# Patient Record
Sex: MALE | Race: WHITE | NOT HISPANIC OR LATINO | URBAN - METROPOLITAN AREA
[De-identification: names, ages, dates, MRNs, and addresses within clinical notes are randomized per-mention and may not be internally consistent; named-entity substitution may affect disease eponyms.]

---

## 2020-05-24 ENCOUNTER — HOSPITAL ENCOUNTER (INPATIENT)
Facility: HOSPITAL | Age: 85
LOS: 4 days | Discharge: HOME HEALTH | DRG: 291 | End: 2020-05-29
Attending: EMERGENCY MEDICINE | Admitting: HOSPITALIST
Payer: MEDICARE

## 2020-05-24 DIAGNOSIS — J18.9 COMMUNITY ACQUIRED PNEUMONIA, UNSPECIFIED LATERALITY: ICD-10-CM

## 2020-05-24 DIAGNOSIS — R07.9 CHEST PAIN: ICD-10-CM

## 2020-05-24 DIAGNOSIS — I50.9 ACUTE CONGESTIVE HEART FAILURE: ICD-10-CM

## 2020-05-24 DIAGNOSIS — R06.02 SHORTNESS OF BREATH: ICD-10-CM

## 2020-05-24 DIAGNOSIS — I50.9 ACUTE ON CHRONIC CONGESTIVE HEART FAILURE, UNSPECIFIED HEART FAILURE TYPE: Primary | ICD-10-CM

## 2020-05-24 LAB
ALBUMIN SERPL BCP-MCNC: 2.7 G/DL (ref 3.5–5.2)
ALP SERPL-CCNC: 155 U/L (ref 55–135)
ALT SERPL W/O P-5'-P-CCNC: 58 U/L (ref 10–44)
ANION GAP SERPL CALC-SCNC: 7 MMOL/L (ref 8–16)
APTT BLDCRRT: 29.9 SEC (ref 21–32)
AST SERPL-CCNC: 101 U/L (ref 10–40)
BASOPHILS # BLD AUTO: 0.03 K/UL (ref 0–0.2)
BASOPHILS NFR BLD: 0.4 % (ref 0–1.9)
BILIRUB SERPL-MCNC: 0.8 MG/DL (ref 0.1–1)
BILIRUB UR QL STRIP: NEGATIVE
BNP SERPL-MCNC: 223 PG/ML (ref 0–99)
BUN SERPL-MCNC: 25 MG/DL (ref 8–23)
CALCIUM SERPL-MCNC: 9.7 MG/DL (ref 8.7–10.5)
CHLORIDE SERPL-SCNC: 103 MMOL/L (ref 95–110)
CLARITY UR REFRACT.AUTO: CLEAR
CO2 SERPL-SCNC: 29 MMOL/L (ref 23–29)
COLOR UR AUTO: YELLOW
CREAT SERPL-MCNC: 1.1 MG/DL (ref 0.5–1.4)
DIFFERENTIAL METHOD: ABNORMAL
EOSINOPHIL # BLD AUTO: 0.4 K/UL (ref 0–0.5)
EOSINOPHIL NFR BLD: 4.9 % (ref 0–8)
ERYTHROCYTE [DISTWIDTH] IN BLOOD BY AUTOMATED COUNT: 14.5 % (ref 11.5–14.5)
EST. GFR  (AFRICAN AMERICAN): >60 ML/MIN/1.73 M^2
EST. GFR  (NON AFRICAN AMERICAN): 58.8 ML/MIN/1.73 M^2
GLUCOSE SERPL-MCNC: 105 MG/DL (ref 70–110)
GLUCOSE UR QL STRIP: NEGATIVE
HCT VFR BLD AUTO: 43.9 % (ref 40–54)
HGB BLD-MCNC: 14.2 G/DL (ref 14–18)
HGB UR QL STRIP: NEGATIVE
HYALINE CASTS UR QL AUTO: 6 /LPF
IMM GRANULOCYTES # BLD AUTO: 0.02 K/UL (ref 0–0.04)
IMM GRANULOCYTES NFR BLD AUTO: 0.3 % (ref 0–0.5)
INR PPP: 1.3 (ref 0.8–1.2)
KETONES UR QL STRIP: NEGATIVE
LACTATE SERPL-SCNC: 1.2 MMOL/L (ref 0.5–2.2)
LEUKOCYTE ESTERASE UR QL STRIP: NEGATIVE
LYMPHOCYTES # BLD AUTO: 0.7 K/UL (ref 1–4.8)
LYMPHOCYTES NFR BLD: 10.4 % (ref 18–48)
MCH RBC QN AUTO: 34.1 PG (ref 27–31)
MCHC RBC AUTO-ENTMCNC: 32.3 G/DL (ref 32–36)
MCV RBC AUTO: 105 FL (ref 82–98)
MICROSCOPIC COMMENT: ABNORMAL
MONOCYTES # BLD AUTO: 1.5 K/UL (ref 0.3–1)
MONOCYTES NFR BLD: 20.6 % (ref 4–15)
NEUTROPHILS # BLD AUTO: 4.5 K/UL (ref 1.8–7.7)
NEUTROPHILS NFR BLD: 63.4 % (ref 38–73)
NITRITE UR QL STRIP: NEGATIVE
NRBC BLD-RTO: 0 /100 WBC
PH UR STRIP: 5 [PH] (ref 5–8)
PLATELET # BLD AUTO: 211 K/UL (ref 150–350)
PMV BLD AUTO: 9.9 FL (ref 9.2–12.9)
POTASSIUM SERPL-SCNC: 5.5 MMOL/L (ref 3.5–5.1)
PROT SERPL-MCNC: 6.6 G/DL (ref 6–8.4)
PROT UR QL STRIP: NEGATIVE
PROTHROMBIN TIME: 12.8 SEC (ref 9–12.5)
RBC # BLD AUTO: 4.17 M/UL (ref 4.6–6.2)
RBC #/AREA URNS AUTO: 3 /HPF (ref 0–4)
SARS-COV-2 RDRP RESP QL NAA+PROBE: NEGATIVE
SODIUM SERPL-SCNC: 139 MMOL/L (ref 136–145)
SP GR UR STRIP: 1.01 (ref 1–1.03)
TROPONIN I SERPL DL<=0.01 NG/ML-MCNC: 0.03 NG/ML (ref 0–0.03)
URN SPEC COLLECT METH UR: NORMAL
WBC # BLD AUTO: 7.1 K/UL (ref 3.9–12.7)
WBC #/AREA URNS AUTO: 1 /HPF (ref 0–5)

## 2020-05-24 PROCEDURE — 87040 BLOOD CULTURE FOR BACTERIA: CPT | Mod: 59

## 2020-05-24 PROCEDURE — 80053 COMPREHEN METABOLIC PANEL: CPT

## 2020-05-24 PROCEDURE — 82607 VITAMIN B-12: CPT

## 2020-05-24 PROCEDURE — 84484 ASSAY OF TROPONIN QUANT: CPT

## 2020-05-24 PROCEDURE — 85610 PROTHROMBIN TIME: CPT

## 2020-05-24 PROCEDURE — 85025 COMPLETE CBC W/AUTO DIFF WBC: CPT

## 2020-05-24 PROCEDURE — 83605 ASSAY OF LACTIC ACID: CPT

## 2020-05-24 PROCEDURE — 85730 THROMBOPLASTIN TIME PARTIAL: CPT

## 2020-05-24 PROCEDURE — 83880 ASSAY OF NATRIURETIC PEPTIDE: CPT

## 2020-05-24 PROCEDURE — 96375 TX/PRO/DX INJ NEW DRUG ADDON: CPT

## 2020-05-24 PROCEDURE — 99285 EMERGENCY DEPT VISIT HI MDM: CPT | Mod: ,,, | Performed by: EMERGENCY MEDICINE

## 2020-05-24 PROCEDURE — 96365 THER/PROPH/DIAG IV INF INIT: CPT

## 2020-05-24 PROCEDURE — 93010 ELECTROCARDIOGRAM REPORT: CPT | Mod: ,,, | Performed by: INTERNAL MEDICINE

## 2020-05-24 PROCEDURE — 93010 EKG 12-LEAD: ICD-10-PCS | Mod: ,,, | Performed by: INTERNAL MEDICINE

## 2020-05-24 PROCEDURE — 99285 EMERGENCY DEPT VISIT HI MDM: CPT | Mod: 25

## 2020-05-24 PROCEDURE — 99285 PR EMERGENCY DEPT VISIT,LEVEL V: ICD-10-PCS | Mod: ,,, | Performed by: EMERGENCY MEDICINE

## 2020-05-24 PROCEDURE — U0002 COVID-19 LAB TEST NON-CDC: HCPCS

## 2020-05-24 PROCEDURE — 82746 ASSAY OF FOLIC ACID SERUM: CPT

## 2020-05-24 PROCEDURE — 81001 URINALYSIS AUTO W/SCOPE: CPT

## 2020-05-24 PROCEDURE — 93005 ELECTROCARDIOGRAM TRACING: CPT

## 2020-05-24 RX ORDER — LISINOPRIL 10 MG/1
10 TABLET ORAL DAILY
Status: ON HOLD | COMMUNITY
End: 2020-05-29 | Stop reason: HOSPADM

## 2020-05-24 RX ORDER — IBUPROFEN 200 MG
1 CAPSULE ORAL DAILY
COMMUNITY

## 2020-05-24 RX ORDER — CIPROFLOXACIN 250 MG/1
250 TABLET, FILM COATED ORAL 2 TIMES DAILY
Status: ON HOLD | COMMUNITY
End: 2020-05-29 | Stop reason: HOSPADM

## 2020-05-24 RX ORDER — SIMVASTATIN 20 MG/1
40 TABLET, FILM COATED ORAL NIGHTLY
COMMUNITY

## 2020-05-25 PROBLEM — I25.10 CORONARY ARTERY DISEASE INVOLVING NATIVE CORONARY ARTERY OF NATIVE HEART WITHOUT ANGINA PECTORIS: Status: ACTIVE | Noted: 2020-05-25

## 2020-05-25 PROBLEM — E87.5 HYPERKALEMIA: Status: ACTIVE | Noted: 2020-05-25

## 2020-05-25 PROBLEM — I48.91 ATRIAL FIBRILLATION: Status: ACTIVE | Noted: 2020-05-25

## 2020-05-25 PROBLEM — I50.9 ACUTE ON CHRONIC CONGESTIVE HEART FAILURE: Status: ACTIVE | Noted: 2020-05-25

## 2020-05-25 PROBLEM — N17.9 AKI (ACUTE KIDNEY INJURY): Status: ACTIVE | Noted: 2020-05-25

## 2020-05-25 PROBLEM — J18.9 BILATERAL PNEUMONIA: Status: ACTIVE | Noted: 2020-05-25

## 2020-05-25 PROBLEM — R74.01 TRANSAMINITIS: Status: ACTIVE | Noted: 2020-05-25

## 2020-05-25 PROBLEM — I27.20 PULMONARY HYPERTENSION: Status: ACTIVE | Noted: 2020-05-25

## 2020-05-25 PROBLEM — I10 ESSENTIAL HYPERTENSION: Status: ACTIVE | Noted: 2020-05-25

## 2020-05-25 PROBLEM — J96.01 ACUTE HYPOXEMIC RESPIRATORY FAILURE: Status: ACTIVE | Noted: 2020-05-25

## 2020-05-25 PROBLEM — Z71.89 DO NOT RESUSCITATE DISCUSSION: Status: ACTIVE | Noted: 2020-05-25

## 2020-05-25 LAB
ALBUMIN SERPL BCP-MCNC: 2.5 G/DL (ref 3.5–5.2)
ALP SERPL-CCNC: 136 U/L (ref 55–135)
ALT SERPL W/O P-5'-P-CCNC: 49 U/L (ref 10–44)
ANION GAP SERPL CALC-SCNC: 8 MMOL/L (ref 8–16)
ASCENDING AORTA: 3.5 CM
AST SERPL-CCNC: 69 U/L (ref 10–40)
AV INDEX (PROSTH): 0.94
AV MEAN GRADIENT: 3 MMHG
AV PEAK GRADIENT: 5 MMHG
AV VALVE AREA: 3.25 CM2
AV VELOCITY RATIO: 0.82
BASOPHILS # BLD AUTO: 0.03 K/UL (ref 0–0.2)
BASOPHILS # BLD AUTO: 0.03 K/UL (ref 0–0.2)
BASOPHILS NFR BLD: 0.4 % (ref 0–1.9)
BASOPHILS NFR BLD: 0.4 % (ref 0–1.9)
BILIRUB SERPL-MCNC: 0.9 MG/DL (ref 0.1–1)
BSA FOR ECHO PROCEDURE: 1.87 M2
BUN SERPL-MCNC: 21 MG/DL (ref 8–23)
CALCIUM SERPL-MCNC: 9.2 MG/DL (ref 8.7–10.5)
CHLORIDE SERPL-SCNC: 100 MMOL/L (ref 95–110)
CHOLEST SERPL-MCNC: 93 MG/DL (ref 120–199)
CHOLEST/HDLC SERPL: 2.7 {RATIO} (ref 2–5)
CO2 SERPL-SCNC: 33 MMOL/L (ref 23–29)
CREAT SERPL-MCNC: 1 MG/DL (ref 0.5–1.4)
CV ECHO LV RWT: 0.3 CM
DIFFERENTIAL METHOD: ABNORMAL
DIFFERENTIAL METHOD: ABNORMAL
DOP CALC AO PEAK VEL: 1.1 M/S
DOP CALC AO VTI: 18.76 CM
DOP CALC LVOT AREA: 3.5 CM2
DOP CALC LVOT DIAMETER: 2.1 CM
DOP CALC LVOT PEAK VEL: 0.9 M/S
DOP CALC LVOT STROKE VOLUME: 61 CM3
DOP CALCLVOT PEAK VEL VTI: 17.62 CM
E/A RATIO: 3.96
E/E' RATIO: 0.13 M/S
ECHO LV POSTERIOR WALL: 0.66 CM (ref 0.6–1.1)
EOSINOPHIL # BLD AUTO: 0.3 K/UL (ref 0–0.5)
EOSINOPHIL # BLD AUTO: 0.4 K/UL (ref 0–0.5)
EOSINOPHIL NFR BLD: 3.9 % (ref 0–8)
EOSINOPHIL NFR BLD: 4.9 % (ref 0–8)
ERYTHROCYTE [DISTWIDTH] IN BLOOD BY AUTOMATED COUNT: 14.3 % (ref 11.5–14.5)
ERYTHROCYTE [DISTWIDTH] IN BLOOD BY AUTOMATED COUNT: 14.3 % (ref 11.5–14.5)
EST. GFR  (AFRICAN AMERICAN): >60 ML/MIN/1.73 M^2
EST. GFR  (NON AFRICAN AMERICAN): >60 ML/MIN/1.73 M^2
ESTIMATED AVG GLUCOSE: 105 MG/DL (ref 68–131)
FOLATE SERPL-MCNC: 17.3 NG/ML (ref 4–24)
FRACTIONAL SHORTENING: 27 % (ref 28–44)
GLUCOSE SERPL-MCNC: 95 MG/DL (ref 70–110)
HBA1C MFR BLD HPLC: 5.3 % (ref 4–5.6)
HCT VFR BLD AUTO: 44.7 % (ref 40–54)
HCT VFR BLD AUTO: 44.7 % (ref 40–54)
HDLC SERPL-MCNC: 35 MG/DL (ref 40–75)
HDLC SERPL: 37.6 % (ref 20–50)
HGB BLD-MCNC: 14.1 G/DL (ref 14–18)
HGB BLD-MCNC: 14.5 G/DL (ref 14–18)
HIV 1+2 AB+HIV1 P24 AG SERPL QL IA: NEGATIVE
IMM GRANULOCYTES # BLD AUTO: 0.02 K/UL (ref 0–0.04)
IMM GRANULOCYTES # BLD AUTO: 0.03 K/UL (ref 0–0.04)
IMM GRANULOCYTES NFR BLD AUTO: 0.3 % (ref 0–0.5)
IMM GRANULOCYTES NFR BLD AUTO: 0.4 % (ref 0–0.5)
INTERVENTRICULAR SEPTUM: 0.57 CM (ref 0.6–1.1)
LA MAJOR: 5.59 CM
LA MINOR: 5.55 CM
LA WIDTH: 3.99 CM
LACTATE SERPL-SCNC: 1 MMOL/L (ref 0.5–2.2)
LDLC SERPL CALC-MCNC: 51.2 MG/DL (ref 63–159)
LEFT ATRIUM SIZE: 4.3 CM
LEFT ATRIUM VOLUME INDEX: 43.2 ML/M2
LEFT ATRIUM VOLUME: 81.23 CM3
LEFT INTERNAL DIMENSION IN SYSTOLE: 3.18 CM (ref 2.1–4)
LEFT VENTRICLE DIASTOLIC VOLUME INDEX: 46.14 ML/M2
LEFT VENTRICLE DIASTOLIC VOLUME: 86.65 ML
LEFT VENTRICLE MASS INDEX: 41 G/M2
LEFT VENTRICLE SYSTOLIC VOLUME INDEX: 21.4 ML/M2
LEFT VENTRICLE SYSTOLIC VOLUME: 40.22 ML
LEFT VENTRICULAR INTERNAL DIMENSION IN DIASTOLE: 4.38 CM (ref 3.5–6)
LEFT VENTRICULAR MASS: 77.59 G
LV LATERAL E/E' RATIO: 0.11 M/S
LV SEPTAL E/E' RATIO: 0.15 M/S
LYMPHOCYTES # BLD AUTO: 0.6 K/UL (ref 1–4.8)
LYMPHOCYTES # BLD AUTO: 0.7 K/UL (ref 1–4.8)
LYMPHOCYTES NFR BLD: 7.6 % (ref 18–48)
LYMPHOCYTES NFR BLD: 8.7 % (ref 18–48)
MAGNESIUM SERPL-MCNC: 1.9 MG/DL (ref 1.6–2.6)
MCH RBC QN AUTO: 33.3 PG (ref 27–31)
MCH RBC QN AUTO: 33.7 PG (ref 27–31)
MCHC RBC AUTO-ENTMCNC: 31.5 G/DL (ref 32–36)
MCHC RBC AUTO-ENTMCNC: 32.4 G/DL (ref 32–36)
MCV RBC AUTO: 104 FL (ref 82–98)
MCV RBC AUTO: 106 FL (ref 82–98)
MONOCYTES # BLD AUTO: 1.4 K/UL (ref 0.3–1)
MONOCYTES # BLD AUTO: 1.5 K/UL (ref 0.3–1)
MONOCYTES NFR BLD: 18.9 % (ref 4–15)
MONOCYTES NFR BLD: 20.4 % (ref 4–15)
MV PEAK A VEL: 0.26 M/S
MV PEAK E VEL: 1.03 M/S
NEUTROPHILS # BLD AUTO: 5 K/UL (ref 1.8–7.7)
NEUTROPHILS # BLD AUTO: 5.1 K/UL (ref 1.8–7.7)
NEUTROPHILS NFR BLD: 66.8 % (ref 38–73)
NEUTROPHILS NFR BLD: 67.3 % (ref 38–73)
NONHDLC SERPL-MCNC: 58 MG/DL
NRBC BLD-RTO: 0 /100 WBC
NRBC BLD-RTO: 0 /100 WBC
PATH REV BLD -IMP: NORMAL
PHOSPHATE SERPL-MCNC: 3.5 MG/DL (ref 2.7–4.5)
PISA TR MAX VEL: 3.68 M/S
PLATELET # BLD AUTO: 214 K/UL (ref 150–350)
PLATELET # BLD AUTO: 216 K/UL (ref 150–350)
PMV BLD AUTO: 9.6 FL (ref 9.2–12.9)
PMV BLD AUTO: 9.8 FL (ref 9.2–12.9)
POTASSIUM SERPL-SCNC: 4.5 MMOL/L (ref 3.5–5.1)
PROT SERPL-MCNC: 5.9 G/DL (ref 6–8.4)
RA MAJOR: 5.63 CM
RA PRESSURE: 15 MMHG
RA WIDTH: 5.24 CM
RBC # BLD AUTO: 4.23 M/UL (ref 4.6–6.2)
RBC # BLD AUTO: 4.3 M/UL (ref 4.6–6.2)
RIGHT VENTRICULAR END-DIASTOLIC DIMENSION: 3.67 CM
RV TISSUE DOPPLER FREE WALL SYSTOLIC VELOCITY 1 (APICAL 4 CHAMBER VIEW): 7.9 CM/S
SINUS: 3.32 CM
SODIUM SERPL-SCNC: 141 MMOL/L (ref 136–145)
STJ: 3.02 CM
TDI LATERAL: 9 M/S
TDI SEPTAL: 7 M/S
TDI: 8 M/S
TR MAX PG: 54 MMHG
TRICUSPID ANNULAR PLANE SYSTOLIC EXCURSION: 1.11 CM
TRIGL SERPL-MCNC: 34 MG/DL (ref 30–150)
TROPONIN I SERPL DL<=0.01 NG/ML-MCNC: 0.04 NG/ML (ref 0–0.03)
TSH SERPL DL<=0.005 MIU/L-ACNC: 2.11 UIU/ML (ref 0.4–4)
TV REST PULMONARY ARTERY PRESSURE: 69 MMHG
VIT B12 SERPL-MCNC: >2000 PG/ML (ref 210–950)
WBC # BLD AUTO: 7.36 K/UL (ref 3.9–12.7)
WBC # BLD AUTO: 7.58 K/UL (ref 3.9–12.7)

## 2020-05-25 PROCEDURE — 11000001 HC ACUTE MED/SURG PRIVATE ROOM

## 2020-05-25 PROCEDURE — 99223 PR INITIAL HOSPITAL CARE,LEVL III: ICD-10-PCS | Mod: AI,GC,, | Performed by: HOSPITALIST

## 2020-05-25 PROCEDURE — 27000221 HC OXYGEN, UP TO 24 HOURS

## 2020-05-25 PROCEDURE — 25000003 PHARM REV CODE 250: Performed by: STUDENT IN AN ORGANIZED HEALTH CARE EDUCATION/TRAINING PROGRAM

## 2020-05-25 PROCEDURE — 94761 N-INVAS EAR/PLS OXIMETRY MLT: CPT

## 2020-05-25 PROCEDURE — 36415 COLL VENOUS BLD VENIPUNCTURE: CPT

## 2020-05-25 PROCEDURE — 85060 PATHOLOGIST REVIEW: ICD-10-PCS | Mod: ,,, | Performed by: PATHOLOGY

## 2020-05-25 PROCEDURE — 83735 ASSAY OF MAGNESIUM: CPT

## 2020-05-25 PROCEDURE — 80061 LIPID PANEL: CPT

## 2020-05-25 PROCEDURE — 84100 ASSAY OF PHOSPHORUS: CPT

## 2020-05-25 PROCEDURE — 84484 ASSAY OF TROPONIN QUANT: CPT

## 2020-05-25 PROCEDURE — 86703 HIV-1/HIV-2 1 RESULT ANTBDY: CPT

## 2020-05-25 PROCEDURE — 85060 BLOOD SMEAR INTERPRETATION: CPT | Mod: ,,, | Performed by: PATHOLOGY

## 2020-05-25 PROCEDURE — 63600175 PHARM REV CODE 636 W HCPCS: Performed by: STUDENT IN AN ORGANIZED HEALTH CARE EDUCATION/TRAINING PROGRAM

## 2020-05-25 PROCEDURE — 99223 1ST HOSP IP/OBS HIGH 75: CPT | Mod: AI,GC,, | Performed by: HOSPITALIST

## 2020-05-25 PROCEDURE — 83605 ASSAY OF LACTIC ACID: CPT

## 2020-05-25 PROCEDURE — 83036 HEMOGLOBIN GLYCOSYLATED A1C: CPT

## 2020-05-25 PROCEDURE — 99900035 HC TECH TIME PER 15 MIN (STAT)

## 2020-05-25 PROCEDURE — 84443 ASSAY THYROID STIM HORMONE: CPT

## 2020-05-25 PROCEDURE — 85025 COMPLETE CBC W/AUTO DIFF WBC: CPT | Mod: 91

## 2020-05-25 PROCEDURE — 80053 COMPREHEN METABOLIC PANEL: CPT

## 2020-05-25 RX ORDER — SODIUM CHLORIDE 0.9 % (FLUSH) 0.9 %
10 SYRINGE (ML) INJECTION
Status: DISCONTINUED | OUTPATIENT
Start: 2020-05-25 | End: 2020-05-29 | Stop reason: HOSPADM

## 2020-05-25 RX ORDER — FUROSEMIDE 10 MG/ML
40 INJECTION INTRAMUSCULAR; INTRAVENOUS 2 TIMES DAILY
Status: DISCONTINUED | OUTPATIENT
Start: 2020-05-25 | End: 2020-05-26

## 2020-05-25 RX ORDER — IBUPROFEN 200 MG
24 TABLET ORAL
Status: DISCONTINUED | OUTPATIENT
Start: 2020-05-25 | End: 2020-05-29 | Stop reason: HOSPADM

## 2020-05-25 RX ORDER — GLUCAGON 1 MG
1 KIT INJECTION
Status: DISCONTINUED | OUTPATIENT
Start: 2020-05-25 | End: 2020-05-29 | Stop reason: HOSPADM

## 2020-05-25 RX ORDER — ONDANSETRON 8 MG/1
8 TABLET, ORALLY DISINTEGRATING ORAL EVERY 8 HOURS PRN
Status: DISCONTINUED | OUTPATIENT
Start: 2020-05-25 | End: 2020-05-29 | Stop reason: HOSPADM

## 2020-05-25 RX ORDER — CEFTRIAXONE 1 G/1
1 INJECTION, POWDER, FOR SOLUTION INTRAMUSCULAR; INTRAVENOUS
Status: COMPLETED | OUTPATIENT
Start: 2020-05-25 | End: 2020-05-25

## 2020-05-25 RX ORDER — FUROSEMIDE 10 MG/ML
40 INJECTION INTRAMUSCULAR; INTRAVENOUS
Status: COMPLETED | OUTPATIENT
Start: 2020-05-25 | End: 2020-05-25

## 2020-05-25 RX ORDER — ENOXAPARIN SODIUM 100 MG/ML
40 INJECTION SUBCUTANEOUS EVERY 24 HOURS
Status: DISCONTINUED | OUTPATIENT
Start: 2020-05-25 | End: 2020-05-25

## 2020-05-25 RX ORDER — ACETAMINOPHEN 500 MG
1000 TABLET ORAL EVERY 8 HOURS PRN
Status: DISCONTINUED | OUTPATIENT
Start: 2020-05-25 | End: 2020-05-29 | Stop reason: HOSPADM

## 2020-05-25 RX ORDER — LISINOPRIL 10 MG/1
10 TABLET ORAL DAILY
Status: DISCONTINUED | OUTPATIENT
Start: 2020-05-25 | End: 2020-05-26

## 2020-05-25 RX ORDER — IBUPROFEN 200 MG
16 TABLET ORAL
Status: DISCONTINUED | OUTPATIENT
Start: 2020-05-25 | End: 2020-05-29 | Stop reason: HOSPADM

## 2020-05-25 RX ORDER — SIMVASTATIN 40 MG/1
40 TABLET, FILM COATED ORAL NIGHTLY
Status: DISCONTINUED | OUTPATIENT
Start: 2020-05-25 | End: 2020-05-29 | Stop reason: HOSPADM

## 2020-05-25 RX ADMIN — FUROSEMIDE 40 MG: 10 INJECTION, SOLUTION INTRAMUSCULAR; INTRAVENOUS at 06:05

## 2020-05-25 RX ADMIN — LISINOPRIL 10 MG: 10 TABLET ORAL at 09:05

## 2020-05-25 RX ADMIN — SIMVASTATIN 40 MG: 40 TABLET, FILM COATED ORAL at 08:05

## 2020-05-25 RX ADMIN — FUROSEMIDE 40 MG: 10 INJECTION, SOLUTION INTRAVENOUS at 12:05

## 2020-05-25 RX ADMIN — AZITHROMYCIN MONOHYDRATE 500 MG: 500 INJECTION, POWDER, LYOPHILIZED, FOR SOLUTION INTRAVENOUS at 12:05

## 2020-05-25 RX ADMIN — FUROSEMIDE 40 MG: 10 INJECTION, SOLUTION INTRAMUSCULAR; INTRAVENOUS at 09:05

## 2020-05-25 RX ADMIN — CEFTRIAXONE SODIUM 1 G: 1 INJECTION, POWDER, FOR SOLUTION INTRAMUSCULAR; INTRAVENOUS at 12:05

## 2020-05-25 RX ADMIN — RIVAROXABAN 15 MG: 15 TABLET, FILM COATED ORAL at 04:05

## 2020-05-25 RX ADMIN — SIMVASTATIN 40 MG: 40 TABLET, FILM COATED ORAL at 02:05

## 2020-05-25 NOTE — HOSPITAL COURSE
Pt diuresing well on 40mg IV lasix BID w/ good UOP and improving SOB. LFTs also improving. BUN/Cr improving. Weaning oxygen. 5/25 2D echo w/ EF 50%, reduced RV systolic function, mild MV regurg, and pulm HTN. TSH and A1C WNL, HIV negative, B12 elevated but smear WNL. Transitioned to PO lasix on 5/27 and O2 weaned to 2L with good UOP. Levofloxacin started on 5/27 for CAP as patient became euvolemic but still requiring O2. O2 further weaned on PO lasix 40mg qd to 1L. Walk test completed. Pt discharged home to complete levaquin dose and home oxygen. F/u cardiology clinic.

## 2020-05-25 NOTE — HPI
Mr. Boris Garcia is a 89 year old male presenting with a chief complaint of SOB. He has a PMH significant for atrial fibrillation (on anticoagulation) and CAD (status post stent placement). The patient reports an approximately two to three week duration of progressively worsening SOB with exertion and laying flat. Symptom is also associated with non-productive cough during episodes of SOB and progressively worsening bilateral lower extremity swelling since symptom onset. He reports symptoms are relieved by rest and sleeping on multiple pillows at night; he is currently requiring 2-3 pillows in order to minimize symptoms. He does not weigh himself and is unsure if he has gained weight. He reports having one stent placed in his heart in approximately 2009, however denies prior history of MI. He also denies symptom association with chest pain, palpitations, fevers, chills, nausea, vomiting, abdominal pain or distention, difficulty urinating or decrease in urination, or prior episodes of similar SOB. He follows with a cardiologist at [facility] (Dr. Harpreet Diaz) and reports making an appointment with him for evaluation of above symptoms, however due to progression of symptoms his daughter contacted EMS after noticing patient appearing SOB. Upon arrival, EMS noted patient to have oxygen saturations in the mid-80's and he was transported to ED here for further care.     At his baseline, he reports living alone and not requiring assistance with ambulation. He is originally from [place], but gets the majority of his medical care at [facility]. His daughter (Eliana Saldaña) is his power of .     ED course: On arrival, his vital signs were significant for tachycardia (120) with respiratory status stable on 3L. Labs were notable for normal WBC, hyperkalemia (5.5), ROSA (BUN 25, Cr 1.1), transaminitis (, ALT 58), elevated BNP (223), and mildly elevated troponin (0.03). He tested negative for COVID-19. CXR was  suggestive of bilateral infiltrates. He received Azithromycin, Ceftriaxone, and Lasix and was admitted to hospital medicine for further management.

## 2020-05-25 NOTE — H&P
[facility] Hospital Medicine  History & Physical    Patient Name: Boris Garcia  MRN: [id number]  Admission Date: 5/24/2020  Attending Physician: Merle Hurtado   Primary Care Provider: To Obtain Unable    Hospital Medicine Team: [facility] 3 [place] MD     Patient information was obtained from patient and ER records.     Subjective:     Principal Problem:Acute hypoxemic respiratory failure    Chief Complaint:   Chief Complaint   Patient presents with    Shortness of Breath     Patient brought in by EMS for evaluation of shortness of breath. Patient is being treated for a UTI currently.         HPI: Mr. Boris Garcia is a 89 year old male presenting with a chief complaint of SOB. He has a PMH significant for atrial fibrillation (on anticoagulation) and CAD (status post stent placement). The patient reports an approximately two to three week duration of progressively worsening SOB with exertion and laying flat. Symptom is also associated with non-productive cough during episodes of SOB and progressively worsening bilateral lower extremity swelling since symptom onset. He reports symptoms are relieved by rest and sleeping on multiple pillows at night; he is currently requiring 2-3 pillows in order to minimize symptoms. He does not weigh himself and is unsure if he has gained weight. He reports having one stent placed in his heart in approximately 2009, however denies prior history of MI. He also denies symptom association with chest pain, palpitations, fevers, chills, nausea, vomiting, abdominal pain or distention, difficulty urinating or decrease in urination, or prior episodes of similar SOB. He follows with a cardiologist at [facility] (Dr. Harpreet Diaz) and reports making an appointment with him for evaluation of above symptoms, however due to progression of symptoms his daughter contacted EMS after noticing patient appearing SOB. Upon arrival, EMS noted patient to have oxygen  saturations in the mid-80's and he was transported to ED here for further care.     At his baseline, he reports living alone and not requiring assistance with ambulation. He is originally from [place], but gets the majority of his medical care at [facility]. His daughter (Eliana Saldaña) is his power of .     ED course: On arrival, his vital signs were significant for tachycardia (120) with respiratory status stable on 3L. Labs were notable for normal WBC, hyperkalemia (5.5), ROSA (BUN 25, Cr 1.1), transaminitis (, ALT 58), elevated BNP (223), and mildly elevated troponin (0.03). He tested negative for COVID-19. CXR was suggestive of bilateral infiltrates. He received Azithromycin, Ceftriaxone, and Lasix and was admitted to hospital medicine for further management.     Past Medical History:   Diagnosis Date   · Atrial fibrillation  · Coronary artery disease  · HTN    Past Surgical History:   · Coronary angioplasty (2009).   · Hernia repair.     Review of patient's allergies indicates:   Allergen Reactions    Penicillins     Sulfa (sulfonamide antibiotics) Hives       No current facility-administered medications on file prior to encounter.      Current Outpatient Medications on File Prior to Encounter   Medication Sig    calcium citrate (CALCITRATE) 200 mg (950 mg) tablet Take 1 tablet by mouth once daily.    ciprofloxacin HCl (CIPRO) 250 MG tablet Take 250 mg by mouth 2 (two) times daily.    lisinopriL 10 MG tablet Take 10 mg by mouth once daily.    rivaroxaban (XARELTO) 15 mg Tab Take 15 mg by mouth daily with dinner or evening meal.    simvastatin (ZOCOR) 20 MG tablet Take 40 mg by mouth every evening.    ciclopirox (LOPROX) 0.77 % Crea Apply twice a day to fungus of back.     Family History     None        Tobacco Use    Smoking status: Not on file   Substance and Sexual Activity    Alcohol use: Not Currently    Drug use: Not on file    Sexual activity: Not on file     Review of Systems    Constitutional: Negative for appetite change, chills, fever and unexpected weight change.   HENT: Negative for sore throat and trouble swallowing.    Eyes: Negative for photophobia and visual disturbance.   Respiratory: Positive for cough and shortness of breath.    Cardiovascular: Positive for leg swelling. Negative for chest pain and palpitations.   Gastrointestinal: Negative for abdominal pain, diarrhea, nausea and vomiting.   Genitourinary: Negative for difficulty urinating.   Musculoskeletal: Negative for back pain, myalgias and neck pain.   Skin: Negative for pallor and rash.   Neurological: Negative for light-headedness, numbness and headaches.     Objective:     Vital Signs (Most Recent):  Temp: 98.1 °F (36.7 °C) (05/24/20 2201)  Pulse: 98 (05/25/20 0039)  Resp: 18 (05/25/20 0039)  BP: (!) 141/83 (05/25/20 0046)  SpO2: 98 % (05/25/20 0039) Vital Signs (24h Range):  Temp:  [98.1 °F (36.7 °C)] 98.1 °F (36.7 °C)  Pulse:  [] 98  Resp:  [16-18] 18  SpO2:  [97 %-100 %] 98 %  BP: (114-141)/(59-83) 141/83     Weight: 69.4 kg (153 lb)  Body mass index is 21.95 kg/m².    Physical Exam   Constitutional: He is oriented to person, place, and time. He appears well-developed and well-nourished. No distress. Nasal cannula in place.   HENT:   Head: Normocephalic and atraumatic.   Mouth/Throat: Oropharynx is clear and moist and mucous membranes are normal.   Eyes: Pupils are equal, round, and reactive to light. Conjunctivae are normal.   Neck: JVD present.   Cardiovascular: Normal rate, regular rhythm, normal heart sounds and normal pulses.   Pulmonary/Chest: Effort normal. No respiratory distress. He has decreased breath sounds in the right upper field and the left upper field. He has rales.   There are bilateral diffuse rales.    Abdominal: Soft. Normal appearance and bowel sounds are normal. He exhibits no distension. There is no tenderness.   Musculoskeletal:   There is diffuse swelling of the bilateral lower  extremities with hyperpigmentation changes of the anterior shins. There is no tenderness with calf palpation.    Neurological: He is alert and oriented to person, place, and time.   Skin: Skin is warm and dry. No bruising and no rash noted.         CRANIAL NERVES     CN III, IV, VI   Pupils are equal, round, and reactive to light.       Significant Labs: All pertinent labs within the past 24 hours have been reviewed.    Significant Imaging: I have reviewed all pertinent imaging results/findings within the past 24 hours.    Assessment/Plan:     * Acute hypoxemic respiratory failure  This is a 89 year old male with PMH notable for atrial fibrillation (on anticoagulation) and CAD (status post stent placement in 2009) who is presenting on 05/24/2020 with almost two week duration of progressively worsening dyspnea on exertion, orthopnea, and lower extremity edema associated with new diagnosis of respiratory failure requiring supplemental oxygen. His presentation is notable for jayy signs of volume overload on exam, elevated BNP, and CXR showing bilateral interstitial opacities. Differential diagnoses favor respiratory failure secondary to acute heart failure of unclear etiology vs occult infection.     Plan:   -Continue diuresis with LASIX 40 mg IV BID.   -Given absent fever, normal WBC, and chronic symptom presentation, will hold off on antibiotic administration and target cardiac etiology of presentation.   -Continue home anticoagulation for atrial fibrillation.   -Trending troponin.   -Screening for HIV and thyroid abnormalities ordered.   -ECHO ordered.   -Lower extremity US ordered.   -Titrate down supplemental oxygen support to goal >92%; avoid hyper-oxygenation.   -Telemetry ordered.   -Strict I/O's and daily weights.   -Cardiac and 2L fluid restricted diet ordered.     ROSA (acute kidney injury)  -Admission notable for BUN/Cr >20 and GFR decline <60 from unknown prior baseline.     Plan:   -Trending renal  "function and urine output daily with IV diuresis.       Essential hypertension  -Home regimen: Lisinopril    Plan:  -Continue home regimen.       Do not resuscitate discussion  -Discussion had at bedside regarding possibility of progression of respiratory failure and possible need for mechanical ventilation. Patient stated that he would not want to be "on a breathing machine" or have chest compressions should his heart stop, due to his advanced age.     Plan:   -DNR form completed in chart; needs attending signature.       Coronary artery disease involving native coronary artery of native heart without angina pectoris  -Status post stent placement in approximately 2009.   -No longer on ASA per his cardiologist.     Plan:   -Continue home Statin.       Transaminitis  -Suspected secondary to congestive hepatopathy in the setting of new CHF exacerbation.     Plan:   -Trending liver function daily.       Hyperkalemia  -Noted on admission, however sample reportedly hemolyzed.   -Status post LASIX.     Plan:   -Repeat kidney function ordered in AM.       Atrial fibrillation  -See assessment for respiratory failure.       Acute on chronic congestive heart failure  -See assessment for respiratory failure.       VTE Risk Mitigation (From admission, onward)         Ordered     rivaroxaban tablet 15 mg  With dinner      05/25/20 0050     IP VTE HIGH RISK PATIENT  Once      05/25/20 0050     Place sequential compression device  Until discontinued      05/25/20 0050                   West Dee MD  Department of Hospital Medicine   [facility]  "

## 2020-05-25 NOTE — ASSESSMENT & PLAN NOTE
-Admission notable for BUN/Cr >20 and GFR decline <60 from unknown prior baseline.     Plan:   -Trending renal function and urine output daily with IV diuresis.

## 2020-05-25 NOTE — ASSESSMENT & PLAN NOTE
-Status post stent placement in approximately 2009.   -No longer on ASA per his cardiologist.     Plan:   -Continue home Statin.

## 2020-05-25 NOTE — MEDICAL/APP STUDENT
HISTORY & PHYSICAL  Hospital Medicine    Team: [facility] [place] [facility]OSP MED 3    PRESENTING HISTORY     Chief Complaint/Reason for Admission:  Shortness of breath    History of Present Illness:  Mr. Boris Garcia is a 89 y.o. male who presented with shortness of breath. Onset of symptoms was gradual starting 2 weeks ago with gradually worsening course since that time. SOB is aggravated on exertion and laying flat.  He also describes an associated productive cough with brown/dark red sputum. Symptoms improve with sitting upright in a chair or laying pillows under his head.  Mr. Garcia states that normally 1 pillow under his head is good enough to sleep at night, but recently describes sit in a chair to get sufficient sleep at night.  Pt denies any sick contacts or COVID + contacts.  He describes a recent UTI that was tx with CIPRO-- but stated that he did not finish the course of ABX.  Pt. Denies n/v, palpitations, or chest pain.   Mr. Garcia is followed by a Cardiologist, Dr. Oreilly at [facility].  Although he had an appointment scheduled with Cardiologist, the progression of his SOB and peripheral edema prompted his daughter to contact EMS and broughr him into [facility] ED.    Mr. Garcia lives alone in his home in [place] and states he is able to walk around fine, but recently lost his 's license due to progressive loss of visual acuity.  His daughter Eliana visits him weekly to provide groceries and company.      ED COURSE   Upon arrival to [facility] ED EMS noted that his O2 Sats were in the mid-80's and his vital signs for significant for a HR of 120.  He had a mild transaminitis (, ALT 58), mildly elevated troponin (0.031).  CXR suggested of bilateral infiltrate.  He received a one time dose of ceftriaxone and azitrhomycin.        Review of Systems:   Review of Systems   Constitutional: Negative for chills, fever and weight loss.   Respiratory: Positive for cough, sputum production and shortness of breath.     Cardiovascular: Positive for orthopnea, leg swelling and PND. Negative for chest pain, palpitations and claudication.   Gastrointestinal: Negative for abdominal pain, diarrhea, nausea and vomiting.   Genitourinary: Negative for dysuria, flank pain, frequency, hematuria and urgency.         PAST HISTORY:     Past Medical History:   Diagnosis Date    Atrial fibrillation     Coronary artery disease     Hypertension        Past Surgical History:   Procedure Laterality Date    CORONARY ANGIOPLASTY  2009       History reviewed. No pertinent family history.    Social History     Socioeconomic History    Marital status: Unknown     Spouse name: Not on file    Number of children: Not on file    Years of education: Not on file    Highest education level: Not on file   Occupational History    Not on file   Social Needs    Financial resource strain: Not on file    Food insecurity:     Worry: Not on file     Inability: Not on file    Transportation needs:     Medical: Not on file     Non-medical: Not on file   Tobacco Use    Smoking status: Former Smoker     Types: Pipe    Smokeless tobacco: Never Used   Substance and Sexual Activity    Alcohol use: Not Currently    Drug use: Never    Sexual activity: Not Currently   Lifestyle    Physical activity:     Days per week: Not on file     Minutes per session: Not on file    Stress: Not on file   Relationships    Social connections:     Talks on phone: Not on file     Gets together: Not on file     Attends Hindu service: Not on file     Active member of club or organization: Not on file     Attends meetings of clubs or organizations: Not on file     Relationship status: Not on file   Other Topics Concern    Not on file   Social History Narrative    Not on file       MEDICATIONS & ALLERGIES:     No current facility-administered medications on file prior to encounter.      Current Outpatient Medications on File Prior to Encounter   Medication Sig Dispense  Refill    calcium citrate (CALCITRATE) 200 mg (950 mg) tablet Take 1 tablet by mouth once daily.      ciprofloxacin HCl (CIPRO) 250 MG tablet Take 250 mg by mouth 2 (two) times daily.      lisinopriL 10 MG tablet Take 10 mg by mouth once daily.      rivaroxaban (XARELTO) 15 mg Tab Take 15 mg by mouth daily with dinner or evening meal.      simvastatin (ZOCOR) 20 MG tablet Take 40 mg by mouth every evening.      ciclopirox (LOPROX) 0.77 % Crea Apply twice a day to fungus of back. 90 g 2        Review of patient's allergies indicates:   Allergen Reactions    Penicillins     Sulfa (sulfonamide antibiotics) Hives       OBJECTIVE:     Vital Signs:  Temp:  [98 °F (36.7 °C)-98.2 °F (36.8 °C)] 98.1 °F (36.7 °C)  Pulse:  [] 89  Resp:  [12-26] 12  SpO2:  [92 %-100 %] 97 %  BP: (114-151)/(59-83) 129/72  Body mass index is 22.46 kg/m².      Physical Exam   Constitutional: He is oriented to person, place, and time and well-developed, well-nourished, and in no distress.   HENT:   Head: Normocephalic and atraumatic.   Cardiovascular: Normal rate, regular rhythm and normal heart sounds. Exam reveals no gallop and no friction rub.   No murmur heard.  Pulmonary/Chest: No respiratory distress. He has decreased breath sounds in the right upper field and the left upper field. He has rales in the right lower field and the left lower field.   Abdominal: He exhibits no distension. There is no tenderness.   Musculoskeletal: He exhibits edema.   Bilateral LE pitting edema   Neurological: He is alert and oriented to person, place, and time.   Skin: Skin is warm and dry. No rash noted. No erythema.         Laboratory  Lab Results   Component Value Date    WBC 7.36 05/25/2020    HGB 14.1 05/25/2020    HCT 44.7 05/25/2020     (H) 05/25/2020     05/25/2020     Recent Labs   Lab 05/25/20  0714   GLU 95      K 4.5      CO2 33*   BUN 21   CREATININE 1.0   CALCIUM 9.2   MG 1.9     Lab Results   Component Value  Date    INR 1.3 (H) 05/24/2020     Lab Results   Component Value Date    HGBA1C 5.3 05/25/2020     No results for input(s): POCTGLUCOSE in the last 72 hours.    Diagnostic Results:      ASSESSMENT & PLAN:     Current Problems List:  Active Hospital Problems    Diagnosis  POA    *Acute hypoxemic respiratory failure [J96.01]  Yes    Acute on chronic congestive heart failure [I50.9]  Yes    Atrial fibrillation [I48.91]  Yes    Hyperkalemia [E87.5]  Yes    Transaminitis [R74.0]  Yes    Coronary artery disease involving native coronary artery of native heart without angina pectoris [I25.10]  Yes    Do not resuscitate discussion [Z71.89]  Not Applicable    Essential hypertension [I10]  Yes    ROSA (acute kidney injury) [N17.9]  Yes      Resolved Hospital Problems   No resolved problems to display.       HIGH RISK CONDITION(S):  {HIGH RISK CONDITIONS:22480}    Problem Assessment & Treatment Plan:    Mr. Garcia is a 89y/o male with a PMH of Afibb (on Xarelto) and CAD that presented with a progressively worsening SOB, LE edema, and orthopnea. DDx: CHF Exacerbation > Pneumonia    Pt's orthopnea, elevated BNP/troponins, CXR findings of 'bilateral patchy airspace infiltrates in the upper lobes and at the left lower lung field' we will treat this as an acute CHF Exacerbation.  Although Mr. Garcia's imaging and multiple risk factors may be suggestive of  a viral pneumonia-- yet his lack of fever and leukocytosis points us closer to his condition being caused by an acute CHF exacerbation.  Will monitor vitals and labs to assess need for abx.      Plan:      CHF Exacerbation   -LASIX 40mg BID   -Monitor I/O's   -ECHO Ordered    -Telemetry   -Continue to monitor troponin    -2L Fluid restricted diet    Increased MCV   -Will check serum vitamin B12    Essential HTN   Continue Lisonopril            Signing Physician:  Geeta Tucker

## 2020-05-25 NOTE — ASSESSMENT & PLAN NOTE
"-Discussion had at bedside regarding possibility of progression of respiratory failure and possible need for mechanical ventilation. Patient stated that he would not want to be "on a breathing machine" or have chest compressions should his heart stop, due to his advanced age.     Plan:   -DNR form completed in chart; needs attending signature.     "

## 2020-05-25 NOTE — ASSESSMENT & PLAN NOTE
-Suspected secondary to congestive hepatopathy in the setting of new CHF exacerbation.     Plan:   -Trending liver function daily.

## 2020-05-25 NOTE — ASSESSMENT & PLAN NOTE
-Noted on admission, however sample reportedly hemolyzed.   -Status post LASIX.     Plan:   -Repeat kidney function ordered in AM.

## 2020-05-25 NOTE — SUBJECTIVE & OBJECTIVE
Past Medical History:   Diagnosis Date    A-fib     CAD (coronary artery disease)     History of heart artery stent     UTI (urinary tract infection)        History reviewed. No pertinent surgical history.    Review of patient's allergies indicates:   Allergen Reactions    Penicillins     Sulfa (sulfonamide antibiotics) Hives       No current facility-administered medications on file prior to encounter.      Current Outpatient Medications on File Prior to Encounter   Medication Sig    calcium citrate (CALCITRATE) 200 mg (950 mg) tablet Take 1 tablet by mouth once daily.    ciprofloxacin HCl (CIPRO) 250 MG tablet Take 250 mg by mouth 2 (two) times daily.    lisinopriL 10 MG tablet Take 10 mg by mouth once daily.    rivaroxaban (XARELTO) 15 mg Tab Take 15 mg by mouth daily with dinner or evening meal.    simvastatin (ZOCOR) 20 MG tablet Take 40 mg by mouth every evening.    ciclopirox (LOPROX) 0.77 % Crea Apply twice a day to fungus of back.     Family History     None        Tobacco Use    Smoking status: Not on file   Substance and Sexual Activity    Alcohol use: Not Currently    Drug use: Not on file    Sexual activity: Not on file     Review of Systems   Constitutional: Negative for appetite change, chills, fever and unexpected weight change.   HENT: Negative for sore throat and trouble swallowing.    Eyes: Negative for photophobia and visual disturbance.   Respiratory: Positive for cough and shortness of breath.    Cardiovascular: Positive for leg swelling. Negative for chest pain and palpitations.   Gastrointestinal: Negative for abdominal pain, diarrhea, nausea and vomiting.   Genitourinary: Negative for difficulty urinating.   Musculoskeletal: Negative for back pain, myalgias and neck pain.   Skin: Negative for pallor and rash.   Neurological: Negative for light-headedness, numbness and headaches.     Objective:     Vital Signs (Most Recent):  Temp: 98.1 °F (36.7 °C) (05/24/20 2201)  Pulse: 98  (05/25/20 0039)  Resp: 18 (05/25/20 0039)  BP: (!) 141/83 (05/25/20 0046)  SpO2: 98 % (05/25/20 0039) Vital Signs (24h Range):  Temp:  [98.1 °F (36.7 °C)] 98.1 °F (36.7 °C)  Pulse:  [] 98  Resp:  [16-18] 18  SpO2:  [97 %-100 %] 98 %  BP: (114-141)/(59-83) 141/83     Weight: 69.4 kg (153 lb)  Body mass index is 21.95 kg/m².    Physical Exam   Constitutional: He is oriented to person, place, and time. He appears well-developed and well-nourished. No distress. Nasal cannula in place.   HENT:   Head: Normocephalic and atraumatic.   Mouth/Throat: Oropharynx is clear and moist and mucous membranes are normal.   Eyes: Pupils are equal, round, and reactive to light. Conjunctivae are normal.   Neck: JVD present.   Cardiovascular: Normal rate, regular rhythm, normal heart sounds and normal pulses.   Pulmonary/Chest: Effort normal. No respiratory distress. He has decreased breath sounds in the right upper field and the left upper field. He has rales.   There are bilateral diffuse rales.    Abdominal: Soft. Normal appearance and bowel sounds are normal. He exhibits no distension. There is no tenderness.   Musculoskeletal:   There is diffuse swelling of the bilateral lower extremities with hyperpigmentation changes of the anterior shins. There is no tenderness with calf palpation.    Neurological: He is alert and oriented to person, place, and time.   Skin: Skin is warm and dry. No bruising and no rash noted.         CRANIAL NERVES     CN III, IV, VI   Pupils are equal, round, and reactive to light.       Significant Labs: All pertinent labs within the past 24 hours have been reviewed.    Significant Imaging: I have reviewed all pertinent imaging results/findings within the past 24 hours.

## 2020-05-25 NOTE — ED PROVIDER NOTES
Encounter Date: 5/24/2020       History     Chief Complaint   Patient presents with    Shortness of Breath     Patient brought in by EMS for evaluation of shortness of breath. Patient is being treated for a UTI currently.      HPI     Mr. Boris Garcia is a 89 year old male with a PMH of CAD s/p stents, HTN, HLD who has presented for shortness of breath. Patient was brought in by EMS who reported they were called by patient's daughter due to patient appearing short of breath. Upon EMS arrival, patient satting 88% on room air. Placed on 3L nasal cannula with improvement to 98%. Patient has reportedly been feeling progressively short of breath for the past few weeks, with acute worsening over the past couple of days. He says that it is much worse at night time when he tries to lie down. Per his daughter, he has had to sleep on multiple pillows, and most recently he has had to sleep sitting up in an arm chair. He also reports that his legs have suddenly become significantly swollen, which had not been the case before. Per his daughter, the she noticed the swelling in the patient's legs for the first time on Thursday. Patient has no known history of heart failure, per daughter. Patient endorses a cough that is non-productive, he thought the cough may be due to allergies, but it has not gone away despite use of allergy medication. Patient is very pleasant and in good spirits, but has poor knowledge of medical history. Knows he had stents placed many years ago but is unable to report further medical history. He does take xarelto. Patient denies any fevers at home. Denies any sick contacts or COVID positive contacts. He does report recent UTI for which he is taking antibiotic (ciprofoxacin). He saw his urologist on Thursday who prescribed the antibiotic. Patient denies chest pain. All other review of systems negative.     Review of patient's allergies indicates:   Allergen Reactions    Penicillins     Sulfa (sulfonamide  antibiotics) Hives     Past Medical History:   Diagnosis Date    A-fib     CAD (coronary artery disease)     History of heart artery stent     UTI (urinary tract infection)      History reviewed. No pertinent surgical history.  History reviewed. No pertinent family history.  Social History     Tobacco Use    Smoking status: Not on file   Substance Use Topics    Alcohol use: Not Currently    Drug use: Not on file     Review of Systems   Constitutional: Negative for activity change, appetite change, diaphoresis, fatigue and fever.   HENT: Negative for congestion, rhinorrhea, sneezing and sore throat.    Eyes: Negative for photophobia, redness and visual disturbance.   Respiratory: Positive for cough and shortness of breath. Negative for chest tightness and wheezing.    Cardiovascular: Positive for leg swelling. Negative for chest pain and palpitations.   Gastrointestinal: Negative for abdominal pain, diarrhea, nausea and vomiting.   Genitourinary: Negative for difficulty urinating, dysuria, frequency and urgency.   Musculoskeletal: Negative for arthralgias, back pain and myalgias.   Skin: Negative for color change, pallor and rash.   Neurological: Negative for facial asymmetry, speech difficulty, weakness, light-headedness, numbness and headaches.   Hematological: Does not bruise/bleed easily.   Psychiatric/Behavioral: Negative for agitation, behavioral problems and confusion.   All other systems reviewed and are negative.      Physical Exam     Initial Vitals [05/24/20 2201]   BP Pulse Resp Temp SpO2   114/68 (!) 120 16 98.1 °F (36.7 °C) 99 %      MAP       --         Physical Exam    Nursing note and vitals reviewed.  Constitutional: He appears well-developed and well-nourished. He is not diaphoretic. No distress.   89 year old male, very pleasant, alert and oriented. Not in acute distress.   HENT:   Head: Normocephalic and atraumatic.   Right Ear: External ear normal.   Left Ear: External ear normal.   Nose:  Nose normal.   Mouth/Throat: Oropharynx is clear and moist. No oropharyngeal exudate.   Eyes: Conjunctivae and EOM are normal. Pupils are equal, round, and reactive to light. No scleral icterus.   Neck: Normal range of motion. Neck supple. No tracheal deviation present.   Cardiovascular: Regular rhythm, normal heart sounds and intact distal pulses.   No murmur heard.  Tachycardic 116   Pulmonary/Chest: Effort normal. No accessory muscle usage or stridor. No respiratory distress. He has no wheezes. He has rhonchi in the right upper field, the right middle field, the right lower field, the left upper field, the left middle field and the left lower field. He exhibits no tenderness.   Crackles heard in all lung fields, worse in lower lung fields.   Abdominal: Soft. Bowel sounds are normal. He exhibits distension. There is no tenderness. There is no rebound and no guarding.   Abdomen is soft but somewhat distended. No tenderness to palpation.   Musculoskeletal: Normal range of motion. He exhibits no tenderness.        Right lower leg: He exhibits edema.        Left lower leg: He exhibits edema.   Significant lower extremity pitting edema  Dry skin changes   Neurological: He is alert and oriented to person, place, and time. He has normal strength and normal reflexes. He displays normal reflexes. No cranial nerve deficit or sensory deficit. GCS score is 15. GCS eye subscore is 4. GCS verbal subscore is 5. GCS motor subscore is 6.   Skin: Skin is warm and dry. Capillary refill takes less than 2 seconds. No erythema. No pallor.   Psychiatric: He has a normal mood and affect. His behavior is normal. Thought content normal.         ED Course   Procedures  Labs Reviewed   CBC W/ AUTO DIFFERENTIAL - Abnormal; Notable for the following components:       Result Value    RBC 4.17 (*)     Mean Corpuscular Volume 105 (*)     Mean Corpuscular Hemoglobin 34.1 (*)     Lymph # 0.7 (*)     Mono # 1.5 (*)     Lymph% 10.4 (*)     Mono% 20.6  (*)     All other components within normal limits   COMPREHENSIVE METABOLIC PANEL - Abnormal; Notable for the following components:    Potassium 5.5 (*)     BUN, Bld 25 (*)     Albumin 2.7 (*)     Alkaline Phosphatase 155 (*)      (*)     ALT 58 (*)     Anion Gap 7 (*)     eGFR if non  58.8 (*)     All other components within normal limits   TROPONIN I - Abnormal; Notable for the following components:    Troponin I 0.031 (*)     All other components within normal limits   B-TYPE NATRIURETIC PEPTIDE - Abnormal; Notable for the following components:     (*)     All other components within normal limits   PROTIME-INR - Abnormal; Notable for the following components:    Prothrombin Time 12.8 (*)     INR 1.3 (*)     All other components within normal limits   URINALYSIS MICROSCOPIC - Abnormal; Notable for the following components:    Hyaline Casts, UA 6 (*)     All other components within normal limits    Narrative:     Preferred Collection Type->Urine, Clean Catch   CULTURE, BLOOD   CULTURE, BLOOD   LACTIC ACID, PLASMA   URINALYSIS, REFLEX TO URINE CULTURE    Narrative:     Preferred Collection Type->Urine, Clean Catch   APTT   SARS-COV-2 RNA AMPLIFICATION, QUAL     EKG Readings: (Independently Interpreted)   Initial Reading: No STEMI. Rhythm: sinus. Heart Rate: 109. Ectopy: Bigeminy. ST Segments: Normal ST Segments. T Waves: Normal. Axis: Right Axis Deviation.       Imaging Results          X-Ray Chest AP Portable (Final result)  Result time 05/24/20 22:59:21    Final result by Mariano Perez MD (05/24/20 22:59:21)                 Impression:      Bilateral patchy airspace infiltrates in the upper lobes and left lower lung field which could represent pneumonia or aspiration.    Cardiomegaly.      Electronically signed by: Mariano Perez MD  Date:    05/24/2020  Time:    22:59             Narrative:    EXAMINATION:  XR CHEST AP PORTABLE    CLINICAL  HISTORY:  Sepsis;    TECHNIQUE:  Single frontal view of the chest was performed.    COMPARISON:  None    FINDINGS:  Bilateral patchy airspace infiltrates in the upper lobes and at the left lower lung field.    No consolidation or pleural effusion.    Cardiomediastinal silhouette is enlarged.                                 Medical Decision Making:   Initial Assessment:   Patient is a 89 year old male with a PMH of CAD s/p stents who has presented for shortness of breath and hypoxia. Patient with progressively worsening shortness of breath for past few weeks, with acute worsening over last couple of days. Patient unable to lie flat at night, requiring multiple pillows and/or sitting upright in chair. Endorses cough over past few weeks, as well. Denies fevers. Endorses recent lower extremity edema - as of Thursday (4 days ago). EMS found patient to be hypoxic to 88% on room air, improvement to 98% with 3L nasal cannula. Patient unable to provide detailed medical history, but did bring medication list with him (now listed in chart). Daughter also unable to provide detailed medical history, no known history of heart failure that she knows of. Reportedly receives most of his care at [facility]. Patient currently being treated for UTI (diagnosed on Thursday).  Physical exam significant for crackles in all lung fields, worse in bilateral lower lung fields. Significant pitting edema of lower extremities. Some abdominal distension, but soft, non-tender. Patient pleasant, alert and oriented. Patient initially tachycardic on arrival to 116, improvement to 90s upon resting.   Differential Diagnosis:   DDx include but not limited to: New onset CHF, pneumonia, COVID, pulmonary edema, ACS   Clinical Tests:   Lab Tests: Ordered and Reviewed  Radiological Study: Ordered and Reviewed  ED Management:  Patient's labs significant for the following: , potassium 5.5, Alk phos 155, , ALT 58, Troponin 0.031. CXR  "concerning for volume overload with areas of consolidation concerning for pneumonia. "Bilateral patchy airspace infiltrates in the upper lobes and left lower lung field which could represent pneumonia or aspiration. Cardiomegaly." Patient treated with ceftriaxone and azithromycin for pneumonia coverage. Patient also given dose of IV lasix (40mg) for fluid overload. At this time, patient has been consulted to the medicine team for admission. Internal inpatient medicine team to admit. Patient amenable to plan.  Other:   I have discussed this case with another health care provider.       <> Summary of the Discussion: Internal Medicine: Admission              Attending Attestation:   Physician Attestation Statement for Resident:  As the supervising MD   Physician Attestation Statement: I have personally seen and examined this patient.   I agree with the above history. -:   As the supervising MD I agree with the above PE.    As the supervising MD I agree with the above treatment, course, plan, and disposition.   -: Patient appears stable on nasal cannula for admission to [facility].                              Clinical Impression:       ICD-10-CM ICD-9-CM   1. Acute on chronic congestive heart failure, unspecified heart failure type I50.9 428.0   2. Shortness of breath R06.02 786.05   3. Community acquired pneumonia, unspecified laterality J18.9 486             ED Disposition Condition    Admit                           Esme Cuellar MD  Resident  05/25/20 0035       Robbie Smith MD  05/25/20 0042    "

## 2020-05-25 NOTE — ASSESSMENT & PLAN NOTE
This is a 89 year old male with PMH notable for atrial fibrillation (on anticoagulation) and CAD (status post stent placement in 2009) who is presenting on 05/24/2020 with almost two week duration of progressively worsening dyspnea on exertion, orthopnea, and lower extremity edema associated with new diagnosis of respiratory failure requiring supplemental oxygen. His presentation is notable for jayy signs of volume overload on exam, elevated BNP, and CXR showing bilateral interstitial opacities. Differential diagnoses favor respiratory failure secondary to acute heart failure of unclear etiology vs occult infection.     Plan:   -Continue diuresis with LASIX 40 mg IV BID.   -Given absent fever, normal WBC, and chronic symptom presentation, will hold off on antibiotic administration and target cardiac etiology of presentation.   -Continue home anticoagulation for atrial fibrillation.   -Trending troponin.   -Screening for HIV and thyroid abnormalities ordered.   -ECHO ordered.   -Lower extremity US ordered.   -Titrate down supplemental oxygen support to goal >92%; avoid hyper-oxygenation.   -Telemetry ordered.   -Strict I/O's and daily weights.   -Cardiac and 2L fluid restricted diet ordered.

## 2020-05-25 NOTE — PLAN OF CARE
Patient AAOx4, bedfast.  Patient states that he normally walks at home, but does not believe that he can do so without losing balance at present.  Unable to maintain condom cath on patient.  VS stable on 3L RA.  Telemetry shows a-fib.  Will continue to monitor.

## 2020-05-25 NOTE — ED NOTES
Pt assisted in re-positioning in stretcher. Pt reports SOB while trying to urinate. Pt HOB elevated to 90 degrees. Oxygen saturation 97% on 3L oxygen per NC. Pt reports feeling better after re-positioning in stretcher. Pt aware of POC and denies needs at this time. Pt remains on continuous cardiac monitor, automated BP cuff, and continuous pulse ox. Side rails raised x2, bed locked and low.

## 2020-05-26 PROBLEM — E87.5 HYPERKALEMIA: Status: RESOLVED | Noted: 2020-05-25 | Resolved: 2020-05-26

## 2020-05-26 LAB
ALBUMIN SERPL BCP-MCNC: 2.2 G/DL (ref 3.5–5.2)
ALP SERPL-CCNC: 123 U/L (ref 55–135)
ALT SERPL W/O P-5'-P-CCNC: 45 U/L (ref 10–44)
ANION GAP SERPL CALC-SCNC: 6 MMOL/L (ref 8–16)
AST SERPL-CCNC: 66 U/L (ref 10–40)
BILIRUB SERPL-MCNC: 0.8 MG/DL (ref 0.1–1)
BUN SERPL-MCNC: 21 MG/DL (ref 8–23)
CALCIUM SERPL-MCNC: 8.6 MG/DL (ref 8.7–10.5)
CHLORIDE SERPL-SCNC: 98 MMOL/L (ref 95–110)
CO2 SERPL-SCNC: 37 MMOL/L (ref 23–29)
CREAT SERPL-MCNC: 0.9 MG/DL (ref 0.5–1.4)
EST. GFR  (AFRICAN AMERICAN): >60 ML/MIN/1.73 M^2
EST. GFR  (NON AFRICAN AMERICAN): >60 ML/MIN/1.73 M^2
GLUCOSE SERPL-MCNC: 80 MG/DL (ref 70–110)
MAGNESIUM SERPL-MCNC: 1.8 MG/DL (ref 1.6–2.6)
PATH REV BLD -IMP: NORMAL
PHOSPHATE SERPL-MCNC: 3.3 MG/DL (ref 2.7–4.5)
POTASSIUM SERPL-SCNC: 3.7 MMOL/L (ref 3.5–5.1)
PROT SERPL-MCNC: 5.3 G/DL (ref 6–8.4)
SODIUM SERPL-SCNC: 141 MMOL/L (ref 136–145)

## 2020-05-26 PROCEDURE — 97116 GAIT TRAINING THERAPY: CPT

## 2020-05-26 PROCEDURE — 94799 UNLISTED PULMONARY SVC/PX: CPT

## 2020-05-26 PROCEDURE — 80053 COMPREHEN METABOLIC PANEL: CPT

## 2020-05-26 PROCEDURE — 63700000 PHARM REV CODE 250 ALT 637 W/O HCPCS: Performed by: STUDENT IN AN ORGANIZED HEALTH CARE EDUCATION/TRAINING PROGRAM

## 2020-05-26 PROCEDURE — 27000221 HC OXYGEN, UP TO 24 HOURS

## 2020-05-26 PROCEDURE — 99233 PR SUBSEQUENT HOSPITAL CARE,LEVL III: ICD-10-PCS | Mod: ,,, | Performed by: HOSPITALIST

## 2020-05-26 PROCEDURE — 97535 SELF CARE MNGMENT TRAINING: CPT

## 2020-05-26 PROCEDURE — 97161 PT EVAL LOW COMPLEX 20 MIN: CPT

## 2020-05-26 PROCEDURE — 83735 ASSAY OF MAGNESIUM: CPT

## 2020-05-26 PROCEDURE — 97165 OT EVAL LOW COMPLEX 30 MIN: CPT

## 2020-05-26 PROCEDURE — 63600175 PHARM REV CODE 636 W HCPCS: Performed by: STUDENT IN AN ORGANIZED HEALTH CARE EDUCATION/TRAINING PROGRAM

## 2020-05-26 PROCEDURE — 99900035 HC TECH TIME PER 15 MIN (STAT)

## 2020-05-26 PROCEDURE — 11000001 HC ACUTE MED/SURG PRIVATE ROOM

## 2020-05-26 PROCEDURE — 84100 ASSAY OF PHOSPHORUS: CPT

## 2020-05-26 PROCEDURE — 36415 COLL VENOUS BLD VENIPUNCTURE: CPT

## 2020-05-26 PROCEDURE — 25000003 PHARM REV CODE 250: Performed by: STUDENT IN AN ORGANIZED HEALTH CARE EDUCATION/TRAINING PROGRAM

## 2020-05-26 PROCEDURE — 99233 SBSQ HOSP IP/OBS HIGH 50: CPT | Mod: ,,, | Performed by: HOSPITALIST

## 2020-05-26 PROCEDURE — 94761 N-INVAS EAR/PLS OXIMETRY MLT: CPT

## 2020-05-26 RX ORDER — FUROSEMIDE 10 MG/ML
40 INJECTION INTRAMUSCULAR; INTRAVENOUS DAILY
Status: DISCONTINUED | OUTPATIENT
Start: 2020-05-26 | End: 2020-05-26

## 2020-05-26 RX ORDER — LISINOPRIL 10 MG/1
10 TABLET ORAL DAILY
Status: DISCONTINUED | OUTPATIENT
Start: 2020-05-27 | End: 2020-05-27

## 2020-05-26 RX ORDER — POTASSIUM CHLORIDE 20 MEQ/15ML
40 SOLUTION ORAL ONCE
Status: COMPLETED | OUTPATIENT
Start: 2020-05-26 | End: 2020-05-26

## 2020-05-26 RX ORDER — FUROSEMIDE 10 MG/ML
40 INJECTION INTRAMUSCULAR; INTRAVENOUS DAILY
Status: DISCONTINUED | OUTPATIENT
Start: 2020-05-26 | End: 2020-05-27

## 2020-05-26 RX ADMIN — FUROSEMIDE 40 MG: 10 INJECTION, SOLUTION INTRAMUSCULAR; INTRAVENOUS at 09:05

## 2020-05-26 RX ADMIN — POTASSIUM CHLORIDE 40 MEQ: 20 SOLUTION ORAL at 09:05

## 2020-05-26 RX ADMIN — RIVAROXABAN 15 MG: 15 TABLET, FILM COATED ORAL at 05:05

## 2020-05-26 RX ADMIN — SIMVASTATIN 40 MG: 40 TABLET, FILM COATED ORAL at 09:05

## 2020-05-26 NOTE — ASSESSMENT & PLAN NOTE
Home regimen: Lisinopril    -Continued home lisinopril initially, but held on 5/26 due to normal Bps after aggressive diuresis

## 2020-05-26 NOTE — PLAN OF CARE
05/26/20 0852   Post-Acute Status   Post-Acute Authorization Home Health   Home Health Status Awaiting Therapy Documentation

## 2020-05-26 NOTE — SUBJECTIVE & OBJECTIVE
Interval History: No acute events overnight. Patient urinated over 3L yesterday and states his breathing today subjectively feels better than yesterday although still requiring 3L NC.    Review of Systems   Constitutional: Negative for appetite change, chills, fever and unexpected weight change.   HENT: Negative for sore throat and trouble swallowing.    Eyes: Negative for photophobia and visual disturbance.   Respiratory: Positive for cough and shortness of breath.    Cardiovascular: Positive for leg swelling. Negative for chest pain and palpitations.   Gastrointestinal: Negative for abdominal pain, diarrhea, nausea and vomiting.   Genitourinary: Negative for difficulty urinating.   Musculoskeletal: Negative for back pain, myalgias and neck pain.   Skin: Negative for pallor and rash.   Neurological: Negative for light-headedness, numbness and headaches.     Objective:     Vital Signs (Most Recent):  Temp: 97.5 °F (36.4 °C) (05/26/20 0801)  Pulse: 77 (05/26/20 0801)  Resp: 16 (05/26/20 0801)  BP: 106/60 (05/26/20 0801)  SpO2: (!) 92 % (05/26/20 0801) Vital Signs (24h Range):  Temp:  [97.5 °F (36.4 °C)-98.6 °F (37 °C)] 97.5 °F (36.4 °C)  Pulse:  [] 77  Resp:  [15-23] 16  SpO2:  [92 %-99 %] 92 %  BP: ()/(60-87) 106/60     Weight: 67.2 kg (148 lb 2.4 oz)  Body mass index is 21.26 kg/m².    Intake/Output Summary (Last 24 hours) at 5/26/2020 1058  Last data filed at 5/26/2020 0600  Gross per 24 hour   Intake 420 ml   Output 3200 ml   Net -2780 ml      Physical Exam   Constitutional: He is oriented to person, place, and time. He appears well-developed and well-nourished. No distress. Nasal cannula in place.   HENT:   Head: Normocephalic and atraumatic.   Mouth/Throat: Oropharynx is clear and moist and mucous membranes are normal.   Eyes: Pupils are equal, round, and reactive to light. Conjunctivae are normal.   Neck: No JVD present.   Cardiovascular: Normal rate, regular rhythm, normal heart sounds and normal  pulses.   Pulmonary/Chest: Effort normal. No respiratory distress. He has decreased breath sounds in the right upper field and the left upper field. He has rales.   There are bilateral diffuse rales, more in the upper lobes bilaterally.   Abdominal: Soft. Normal appearance and bowel sounds are normal. He exhibits no distension. There is no tenderness.   Musculoskeletal:   Trace pretibial pitting edema bilaterally, improved from yesterday. Bilateral pretibial hyperpigmentation c/w stasis dermatitis. There is no tenderness with calf palpation.    Neurological: He is alert and oriented to person, place, and time.   Skin: Skin is warm and dry. No bruising and no rash noted.       Significant Labs:   BMP:   Recent Labs   Lab 05/26/20  0413   GLU 80      K 3.7   CL 98   CO2 37*   BUN 21   CREATININE 0.9   CALCIUM 8.6*   MG 1.8     CBC:   Recent Labs   Lab 05/24/20  2222 05/25/20  0714 05/25/20  1508   WBC 7.10 7.36 7.58   HGB 14.2 14.1 14.5   HCT 43.9 44.7 44.7    216 214     All pertinent labs within the past 24 hours have been reviewed.    Significant Imaging: I have reviewed all pertinent imaging results/findings within the past 24 hours.

## 2020-05-26 NOTE — PT/OT/SLP EVAL
Physical Therapy Evaluation    Patient Name:  Boris Garcia   MRN:  [id number]    Recommendations:     Discharge Recommendations:  home health PT   Discharge Equipment Recommendations: none   Barriers to discharge: None    Assessment:     Boris Garcia is a 89 y.o. male admitted with a medical diagnosis of Acute hypoxemic respiratory failure.  He presents with the following impairments/functional limitations:  weakness, gait instability, impaired cardiopulmonary response to activity, impaired endurance, impaired balance, decreased lower extremity function, impaired self care skills, decreased safety awareness, impaired functional mobilty. Pt evaluated on this date and functioning near reported baseline. Pt would benefit from continued skilled acute PT 4x/wk to improve functional mobility.  Recommending pt receive PT services in HHPT setting following discharge from hospital once medically cleared. pt safe to mobilize with nursing utilizing RW ordered to room.     Rehab Prognosis: Fair; patient would benefit from acute skilled PT services to address these deficits and reach maximum level of function.    Recent Surgery: * No surgery found *      Plan:     During this hospitalization, patient to be seen 4 x/week to address the identified rehab impairments via gait training, therapeutic activities, therapeutic exercises, neuromuscular re-education and progress toward the following goals:    · Plan of Care Expires:  06/25/20    Subjective     Chief Complaint: none noted   Patient/Family Comments/goals: Pt willing to participate with therapy on this date.    Pain/Comfort:  · Pain Rating 1: 0/10    Patients cultural, spiritual, Anabaptist conflicts given the current situation: no    Living Environment:  Pt lives alone in a [facility] w/1 [facility].   Prior to admission, patients level of function was (I) w/ADLs and amb Mod I w/RW or QC PRN.  Equipment used at home: walker, rolling, cane, quad, shower chair.  DME owned (not currently  used): none.      Objective:     Communicated with NSG prior to session.  Patient found HOB elevated with    upon PT entry to room.    General Precautions: Standard, fall   Orthopedic Precautions:N/A   Braces: N/A     Exams:  · Gross Motor Coordination:  WFL  · RLE ROM: WFL  · RLE Strength: WFL  · LLE ROM: WFL  · LLE Strength: WFL    Functional Mobility:  · Bed Mobility:     · Scooting: stand by assistance  · Supine to Sit: stand by assistance  · Transfers:     · Sit to Stand:  stand by assistance with rolling walker  · Toilet Transfer: contact guard assistance with  rolling walker and grab bars  using  Step Transfer  · Gait: 18ft x2 SBA w/RW  · Balance: SBA      Therapeutic Activities and Exercises:   - Pt educated on:   -PT roles, expectations, and POC    -Safety with mobility   -Benefits of OOB activities to increase strength and functional mobility    -Performing ther ex for increasing LE ROM and strength   -Discharge recommendations     AM-PAC 6 CLICK MOBILITY  Total Score:18     Patient left up in chair with all lines intact and call button in reach.    GOALS:   Multidisciplinary Problems     Physical Therapy Goals        Problem: Physical Therapy Goal    Goal Priority Disciplines Outcome Goal Variances Interventions   Physical Therapy Goal     PT, PT/OT Ongoing, Progressing     Description:  Goals to be met by: 2020    Patient will increase functional independence with mobility by performin. Supine to sit with Modified [place]  2. Sit to supine with Modified [place]  3. Sit to stand transfer with Modified [place]  4. Gait  x 50 feet with Modified [place] using LRAD  5. Lower extremity exercise program x15 reps, with independence                     History:     Past Medical History:   Diagnosis Date    Atrial fibrillation     Coronary artery disease     Hypertension        Past Surgical History:   Procedure Laterality Date    CORONARY ANGIOPLASTY         Time Tracking:      PT Received On: 05/26/20  PT Start Time: 0859     PT Stop Time: 0918  PT Total Time (min): 19 min     Billable Minutes: Evaluation 10 and Gait Training 9      Chavez Jones, PT  05/26/2020

## 2020-05-26 NOTE — PLAN OF CARE
Problem: Fall Injury Risk  Goal: Absence of Fall and Fall-Related Injury  Outcome: Ongoing, Progressing     Problem: Adult Inpatient Plan of Care  Goal: Plan of Care Review  Outcome: Ongoing, Progressing  Goal: Patient-Specific Goal (Individualization)  Outcome: Ongoing, Progressing  Goal: Absence of Hospital-Acquired Illness or Injury  Outcome: Ongoing, Progressing  Goal: Optimal Comfort and Wellbeing  Outcome: Ongoing, Progressing  Goal: Readiness for Transition of Care  Outcome: Ongoing, Progressing  Goal: Rounds/Family Conference  Outcome: Ongoing, Progressing     Problem: Electrolyte Imbalance (Acute Kidney Injury/Impairment)  Goal: Serum Electrolyte Balance  Outcome: Ongoing, Progressing     Problem: Fluid Imbalance (Acute Kidney Injury/Impairment)  Goal: Optimal Fluid Balance  Outcome: Ongoing, Progressing     Problem: Hematologic Alteration (Acute Kidney Injury/Impairment)  Goal: Hemoglobin, Hematocrit and Platelets Within Normal Range  Outcome: Ongoing, Progressing     Problem: Oral Intake Inadequate (Acute Kidney Injury/Impairment)  Goal: Optimal Nutrition Intake  Outcome: Ongoing, Progressing     Problem: Renal Function Impairment (Acute Kidney Injury/Impairment)  Goal: Effective Renal Function  Outcome: Ongoing, Progressing     Problem: Skin Injury Risk Increased  Goal: Skin Health and Integrity  Outcome: Ongoing, Progressing

## 2020-05-26 NOTE — PT/OT/SLP EVAL
Occupational Therapy   Evaluation/Treatment    Name: Boris Garcia  MRN: [id number]  Admitting Diagnosis:  Acute hypoxemic respiratory failure      Recommendations:     Discharge Recommendations: home health OT  Discharge Equipment Recommendations:  none  Barriers to discharge:  None    Assessment:     Boris Garcia is a 89 y.o. male with a medical diagnosis of Acute hypoxemic respiratory failure.  Performance deficits affecting function: weakness, impaired endurance, impaired self care skills, impaired functional mobilty, gait instability, impaired balance, decreased safety awareness, decreased lower extremity function, impaired cardiopulmonary response to activity. Patient would benefit from continued skilled acute OT 3x/wk to improve functional mobility, increase independence with ADLs, and address established goals. Recommending HHOT once medically appropriate for discharge to increase maximal independence, reduce burden of care, and ensure safety.      Rehab Prognosis: Good; patient would benefit from acute skilled OT services to address these deficits and reach maximum level of function.       Plan:     Patient to be seen 3 x/week to address the above listed problems via self-care/home management, therapeutic activities, therapeutic exercises  · Plan of Care Expires: 06/26/20  · Plan of Care Reviewed with: patient    Subjective     Chief Complaint: none noted  Patient/Family Comments/goals: to go home    Occupational Profile:  Living Environment: Patient lives alone in a [facility] with 1 [address] to enter. Patient has a tub shower combo with a shower chair. Patient has a regular toilet. Patient owns a RW and quad cane as needed. Patient reports he was independent with ADLs PTA.     Pain/Comfort:  · Pain Rating 1: 0/10  · Pain Rating Post-Intervention 1: 0/10    Patients cultural, spiritual, Yazdanism conflicts given the current situation: no    Objective:     Communicated with: MERCEDES prior to session.  Patient found HOB  elevated upon OT entry to room.    General Precautions: Standard, fall   Orthopedic Precautions:N/A   Braces: N/A     Occupational Performance:    Bed Mobility:    · Patient completed Scooting/Bridging with stand by assistance to EOB sitting EOB  · Patient completed Supine to Sit with stand by assistance using handrail and HOB elevated    Functional Mobility/Transfers:  · Patient completed Sit <> Stand Transfer with stand by assistance  with  rolling walker   · Patient completed Toilet Transfer bed>toilet; toilet>bedside chair with functional ambulation technique with contact guard assistance with  rolling walker and grab bars    Activities of Daily Living:  · Feeding:  independence    · Upper Body Dressing: minimum assistance Donning back gown  · Lower Body Dressing: supervision [place] and donning socks    Cognitive/Visual Perceptual:  Cognitive/Psychosocial Skills:     -       Oriented to: Person, Place, Time and Situation   -       Follows Commands/attention:Follows multistep  commands  -       Communication: clear/fluent  -       Memory: No Deficits noted  -       Safety awareness/insight to disability: intact   -       Mood/Affect/Coping skills/emotional control: Appropriate to situation  Visual/Perceptual:      -Intact      Physical Exam:  Upper Extremity Range of Motion:     -       Right Upper Extremity: WFL  -       Left Upper Extremity: WFL  Upper Extremity Strength:    -       Right Upper Extremity: WFL  -       Left Upper Extremity: WFL   Strength:    -       Right Upper Extremity: WFL  -       Left Upper Extremity: WFL  Fine Motor Coordination:    -       Intact  Gross motor coordination:   WFL    AMPAC 6 Click ADL:  AMPAC Total Score: 19    Treatment & Education:  Role of OT and POC  ADL retraining  Functional mobility training  Safety  Discharge planning  Importance of OOB activity  Education:    Patient left up in chair with call button in reach, nurse notified and all needs met.     GOALS:    Multidisciplinary Problems     Occupational Therapy Goals        Problem: Occupational Therapy Goal    Goal Priority Disciplines Outcome Interventions   Occupational Therapy Goal     OT, PT/OT Ongoing, Progressing    Description:  Goals to be met by: 6/9/2020     Patient will increase functional independence with ADLs by performing:    UE Dressing with Set-up Assistance.  Grooming while standing at sink with Modified [place].  Stand pivot transfers with Modified [place].  Toilet transfer to toilet with Modified [place].                      History:     Past Medical History:   Diagnosis Date    Atrial fibrillation     Coronary artery disease     Hypertension        Past Surgical History:   Procedure Laterality Date    CORONARY ANGIOPLASTY  2009       Time Tracking:     OT Date of Treatment: 05/26/20  OT Start Time: 0900  OT Stop Time: 0920  OT Total Time (min): 20 min    Billable Minutes:Evaluation 8  Self Care/Home Management 12    FRITZ Acosta  5/26/2020

## 2020-05-26 NOTE — ASSESSMENT & PLAN NOTE
Admission notable for BUN/Cr >20 and GFR decline <60 from unknown prior baseline.    -Trending renal function daily  -Strict ins and outs  -Avoid nephrotoxic meds

## 2020-05-26 NOTE — ASSESSMENT & PLAN NOTE
Status post stent placement in approximately 2009. No longer on ASA per his cardiologist.      -Continue home Statin

## 2020-05-26 NOTE — ASSESSMENT & PLAN NOTE
"Discussion had at bedside regarding possibility of progression of respiratory failure and possible need for mechanical ventilation. Patient stated that he would not want to be "on a breathing machine" or have chest compressions should his heart stop, due to his advanced age. DNR form completed in chart; signed by attending physician.   "

## 2020-05-26 NOTE — ASSESSMENT & PLAN NOTE
Suspected secondary to congestive hepatopathy in the setting of new CHF exacerbation.     -Trending liver function daily.   -Improved with diuresis

## 2020-05-26 NOTE — PLAN OF CARE
Problem: Adult Inpatient Plan of Care  Goal: Plan of Care Review  Outcome: Ongoing, Progressing     Problem: Fall Injury Risk  Goal: Absence of Fall and Fall-Related Injury  Outcome: Ongoing, Progressing   Slept most of this shift. Voided large amount in urinal this shift. VSS. Reposition every 2 hours. Denies any complaint.

## 2020-05-26 NOTE — PLAN OF CARE
Thuyal completed and POC established     Hector Jones, PT, DPT  2020       Problem: Physical Therapy Goal  Goal: Physical Therapy Goal  Description  Goals to be met by: 2020    Patient will increase functional independence with mobility by performin. Supine to sit with Modified [place]  2. Sit to supine with Modified [place]  3. Sit to stand transfer with Modified [place]  4. Gait  x 50 feet with Modified [place] using LRAD  5. Lower extremity exercise program x15 reps, with independence    Outcome: Ongoing, Progressing

## 2020-05-26 NOTE — PLAN OF CARE
Problem: Occupational Therapy Goal  Goal: Occupational Therapy Goal  Description  Goals to be met by: 6/9/2020     Patient will increase functional independence with ADLs by performing:    UE Dressing with Set-up Assistance.  Grooming while standing at sink with Modified [place].  Stand pivot transfers with Modified [place].  Toilet transfer to toilet with Modified [place].     Outcome: Ongoing, Progressing   Patient's goals are set.  FRITZ Acosta  5/26/2020

## 2020-05-26 NOTE — ASSESSMENT & PLAN NOTE
This is a 89 year old male with PMH notable for atrial fibrillation (on anticoagulation) and CAD (status post stent placement in 2009) who is presenting on 05/24/2020 with almost two week duration of progressively worsening dyspnea on exertion, orthopnea, and lower extremity edema associated with new diagnosis of respiratory failure requiring supplemental oxygen. His presentation is notable for jayy signs of volume overload on exam, elevated BNP, and CXR showing bilateral interstitial opacities. Differential diagnoses favor respiratory failure secondary to acute heart failure of unclear etiology vs occult infection.     -Continued diuresis with LASIX 40 mg IV BID, de-escalated to qd once patient's Bps trended down and was net negative 2.7L in one day  -Given absent fever, normal WBC, and chronic symptom presentation, will hold off on antibiotic administration and target cardiac etiology of presentation. However if patient achieves euvolemia and still requiring O2, may consider sputum culture (if able to produce) and repeat CXR to see if there is an underlying infectious process  -Continue home anticoagulation for atrial fibrillation.   -Screening for HIV and thyroid abnormalities WNL  -TTE shows LV EF 50% however moderate RV systolic dysfunction, CVP 15 and PA pressure 69   -Lower extremity US negative for DVT  -Titrate down supplemental oxygen support to goal >92%; avoid hyper-oxygenation.   -Telemetry ordered.   -Strict I/O's and daily weights.   -Cardiac and 2L fluid restricted diet ordered.

## 2020-05-26 NOTE — ASSESSMENT & PLAN NOTE
Noted on TTE. Likely 2/2 overall volume overload, would need to reassess outpatient once euvolemic.

## 2020-05-27 LAB
ALBUMIN SERPL BCP-MCNC: 2.3 G/DL (ref 3.5–5.2)
ALP SERPL-CCNC: 137 U/L (ref 55–135)
ALT SERPL W/O P-5'-P-CCNC: 55 U/L (ref 10–44)
ANION GAP SERPL CALC-SCNC: 6 MMOL/L (ref 8–16)
AST SERPL-CCNC: 77 U/L (ref 10–40)
BILIRUB SERPL-MCNC: 0.9 MG/DL (ref 0.1–1)
BUN SERPL-MCNC: 24 MG/DL (ref 8–23)
CALCIUM SERPL-MCNC: 8.6 MG/DL (ref 8.7–10.5)
CHLORIDE SERPL-SCNC: 99 MMOL/L (ref 95–110)
CO2 SERPL-SCNC: 35 MMOL/L (ref 23–29)
CREAT SERPL-MCNC: 0.8 MG/DL (ref 0.5–1.4)
EST. GFR  (AFRICAN AMERICAN): >60 ML/MIN/1.73 M^2
EST. GFR  (NON AFRICAN AMERICAN): >60 ML/MIN/1.73 M^2
GLUCOSE SERPL-MCNC: 82 MG/DL (ref 70–110)
MAGNESIUM SERPL-MCNC: 1.8 MG/DL (ref 1.6–2.6)
PHOSPHATE SERPL-MCNC: 3.1 MG/DL (ref 2.7–4.5)
POTASSIUM SERPL-SCNC: 4.1 MMOL/L (ref 3.5–5.1)
PROT SERPL-MCNC: 5.4 G/DL (ref 6–8.4)
SODIUM SERPL-SCNC: 140 MMOL/L (ref 136–145)

## 2020-05-27 PROCEDURE — 94761 N-INVAS EAR/PLS OXIMETRY MLT: CPT

## 2020-05-27 PROCEDURE — 25000003 PHARM REV CODE 250: Performed by: STUDENT IN AN ORGANIZED HEALTH CARE EDUCATION/TRAINING PROGRAM

## 2020-05-27 PROCEDURE — 84100 ASSAY OF PHOSPHORUS: CPT

## 2020-05-27 PROCEDURE — 97116 GAIT TRAINING THERAPY: CPT

## 2020-05-27 PROCEDURE — 36415 COLL VENOUS BLD VENIPUNCTURE: CPT

## 2020-05-27 PROCEDURE — 99233 SBSQ HOSP IP/OBS HIGH 50: CPT | Mod: ,,, | Performed by: HOSPITALIST

## 2020-05-27 PROCEDURE — 83735 ASSAY OF MAGNESIUM: CPT

## 2020-05-27 PROCEDURE — 11000001 HC ACUTE MED/SURG PRIVATE ROOM

## 2020-05-27 PROCEDURE — 80053 COMPREHEN METABOLIC PANEL: CPT

## 2020-05-27 PROCEDURE — 99233 PR SUBSEQUENT HOSPITAL CARE,LEVL III: ICD-10-PCS | Mod: ,,, | Performed by: HOSPITALIST

## 2020-05-27 PROCEDURE — 97530 THERAPEUTIC ACTIVITIES: CPT

## 2020-05-27 PROCEDURE — 27000221 HC OXYGEN, UP TO 24 HOURS

## 2020-05-27 PROCEDURE — 99900035 HC TECH TIME PER 15 MIN (STAT)

## 2020-05-27 RX ORDER — FUROSEMIDE 40 MG/1
40 TABLET ORAL 2 TIMES DAILY
Status: DISCONTINUED | OUTPATIENT
Start: 2020-05-27 | End: 2020-05-28

## 2020-05-27 RX ORDER — LISINOPRIL 10 MG/1
10 TABLET ORAL DAILY
Status: DISCONTINUED | OUTPATIENT
Start: 2020-05-28 | End: 2020-05-28

## 2020-05-27 RX ADMIN — LEVOFLOXACIN 750 MG: 500 TABLET, FILM COATED ORAL at 11:05

## 2020-05-27 RX ADMIN — RIVAROXABAN 15 MG: 15 TABLET, FILM COATED ORAL at 05:05

## 2020-05-27 RX ADMIN — SIMVASTATIN 40 MG: 40 TABLET, FILM COATED ORAL at 08:05

## 2020-05-27 RX ADMIN — FUROSEMIDE 40 MG: 40 TABLET ORAL at 05:05

## 2020-05-27 RX ADMIN — FUROSEMIDE 40 MG: 40 TABLET ORAL at 10:05

## 2020-05-27 NOTE — ASSESSMENT & PLAN NOTE
Suspected secondary to congestive hepatopathy in the setting of new CHF exacerbation.     -Trending liver function daily.   - Improved with diuresis

## 2020-05-27 NOTE — PLAN OF CARE
MD notified  of daughter's preference Cardiology F/U with Dr. Earl.  completed F/U for June 17,2020 @10am [facility] 8th floor [phone]. Left msg for daughter to return call regarding HH preferences.     05/27/20 1459   Post-Acute Status   Post-Acute Authorization Home Health   Home Health Status Awaiting Internal Medical Clearance   Discharge Delays None known at this time   Discharge Plan   Discharge Plan A Home with family;Home Health   Discharge Plan B Home with family   Pippa Orozco, MSN  Case Management  Ext [phone]

## 2020-05-27 NOTE — PROGRESS NOTES
[facility] Hospital Medicine  Progress Note    Patient Name: Boris Garcia  MRN: [id number]  Patient Class: IP- Inpatient   Admission Date: 5/24/2020  Length of Stay: 2 days  Attending Physician: Merle Hurtado MD  Primary Care Provider: To Obtain Unable    [facility] Medicine Team: [facility] MED 3 Rafa Barnard DO    Subjective:     Principal Problem:Acute hypoxemic respiratory failure        HPI:  Mr. Boris Garcia is a 89 year old male presenting with a chief complaint of SOB. He has a PMH significant for atrial fibrillation (on anticoagulation) and CAD (status post stent placement). The patient reports an approximately two to three week duration of progressively worsening SOB with exertion and laying flat. Symptom is also associated with non-productive cough during episodes of SOB and progressively worsening bilateral lower extremity swelling since symptom onset. He reports symptoms are relieved by rest and sleeping on multiple pillows at night; he is currently requiring 2-3 pillows in order to minimize symptoms. He does not weigh himself and is unsure if he has gained weight. He reports having one stent placed in his heart in approximately 2009, however denies prior history of MI. He also denies symptom association with chest pain, palpitations, fevers, chills, nausea, vomiting, abdominal pain or distention, difficulty urinating or decrease in urination, or prior episodes of similar SOB. He follows with a cardiologist at [facility] (Dr. Harpreet Diaz) and reports making an appointment with him for evaluation of above symptoms, however due to progression of symptoms his daughter contacted EMS after noticing patient appearing SOB. Upon arrival, EMS noted patient to have oxygen saturations in the mid-80's and he was transported to ED here for further care.     At his baseline, he reports living alone and not requiring assistance with ambulation. He is originally from [place], but gets the  majority of his medical care at [facility]. His daughter (Eliana Saldaña) is his power of .     ED course: On arrival, his vital signs were significant for tachycardia (120) with respiratory status stable on 3L. Labs were notable for normal WBC, hyperkalemia (5.5), ROSA (BUN 25, Cr 1.1), transaminitis (, ALT 58), elevated BNP (223), and mildly elevated troponin (0.03). He tested negative for COVID-19. CXR was suggestive of bilateral infiltrates. He received Azithromycin, Ceftriaxone, and Lasix and was admitted to hospital medicine for further management.     Overview/Hospital Course:  Pt diuresing well on 40mg IV lasix BID w/ good UOP and improving SOB. LFTs also improving. BUN/Cr improving. Weaning oxygen. 5/25 2D echo w/ EF 50%, reduced RV systolic function, mild MV regurg, and pulm HTN. TSH and A1C WNL, HIV negative, B12 elevated but smear WNL. Transitioned to PO lasix on 5/27 and O2 weaned.     Interval History: No acute events overnight. Good UOP even after IV lasix frequency decreased from BID to qd. Spent more time up in his chair yesterday and worked with PT/OT. Still requiring O2 and feels dyspneic, supplemental O2 2-3L. No purulent sputum production and afebrile.    Review of Systems   Constitutional: Negative for appetite change, chills, fever and unexpected weight change.   HENT: Negative for sore throat and trouble swallowing.    Eyes: Negative for photophobia and visual disturbance.   Respiratory: Positive for cough and shortness of breath.    Cardiovascular: Negative for chest pain, palpitations and leg swelling.   Gastrointestinal: Negative for abdominal pain, diarrhea, nausea and vomiting.   Genitourinary: Negative for difficulty urinating.   Musculoskeletal: Negative for back pain, myalgias and neck pain.   Skin: Negative for pallor and rash.   Neurological: Negative for light-headedness, numbness and headaches.     Objective:     Vital Signs (Most Recent):  Temp: 98.1 °F (36.7 °C)  (05/27/20 0840)  Pulse: 88 (05/27/20 0840)  Resp: 16 (05/27/20 0840)  BP: 123/67 (05/27/20 0840)  SpO2: 96 % (05/27/20 0900) Vital Signs (24h Range):  Temp:  [96.4 °F (35.8 °C)-98.1 °F (36.7 °C)] 98.1 °F (36.7 °C)  Pulse:  [74-99] 88  Resp:  [16-24] 16  SpO2:  [90 %-97 %] 96 %  BP: ()/(42-71) 123/67     Weight: 65.8 kg (145 lb 1 oz)  Body mass index is 20.81 kg/m².    Intake/Output Summary (Last 24 hours) at 5/27/2020 0945  Last data filed at 5/27/2020 0400  Gross per 24 hour   Intake 90 ml   Output 1925 ml   Net -1835 ml      Physical Exam   Constitutional: He is oriented to person, place, and time. He appears well-developed and well-nourished. No distress. Nasal cannula in place.   HENT:   Head: Normocephalic and atraumatic.   Mouth/Throat: Oropharynx is clear and moist and mucous membranes are normal.   Eyes: Pupils are equal, round, and reactive to light. Conjunctivae are normal.   Neck: No JVD present.   Cardiovascular: Normal rate, regular rhythm, normal heart sounds and normal pulses.   Pulmonary/Chest: Effort normal. No respiratory distress. He has decreased breath sounds in the right lower field and the left lower field. He has rales (improved) in the right upper field and the left upper field.   Abdominal: Soft. Normal appearance and bowel sounds are normal. He exhibits no distension. There is no tenderness.   Musculoskeletal: Normal range of motion. He exhibits no edema.   Bilateral pretibial hyperpigmentation c/w stasis dermatitis. There is no tenderness with calf palpation.    Neurological: He is alert and oriented to person, place, and time.   Skin: Skin is warm and dry. No bruising and no rash noted.       Significant Labs:   CBC:   Recent Labs   Lab 05/25/20  1508   WBC 7.58   HGB 14.5   HCT 44.7        CMP:   Recent Labs   Lab 05/26/20  0413 05/27/20  0335    140   K 3.7 4.1   CL 98 99   CO2 37* 35*   GLU 80 82   BUN 21 24*   CREATININE 0.9 0.8   CALCIUM 8.6* 8.6*   PROT 5.3* 5.4*    ALBUMIN 2.2* 2.3*   BILITOT 0.8 0.9   ALKPHOS 123 137*   AST 66* 77*   ALT 45* 55*   ANIONGAP 6* 6*   EGFRNONAA >60.0 >60.0     All pertinent labs within the past 24 hours have been reviewed.    Significant Imaging: I have reviewed all pertinent imaging results/findings within the past 24 hours.      Assessment/Plan:      * Acute hypoxemic respiratory failure  Acute on chronic congestive heart failure  HFpEF  This is a 89 year old male with PMH notable for atrial fibrillation (on anticoagulation) and CAD (status post stent placement in 2009) who is presenting on 05/24/2020 with almost two week duration of progressively worsening dyspnea on exertion, orthopnea, and lower extremity edema associated with new diagnosis of respiratory failure requiring supplemental oxygen. His presentation is notable for jayy signs of volume overload on exam, elevated BNP, and CXR showing bilateral interstitial opacities. Differential diagnoses favor respiratory failure secondary to acute heart failure of unclear etiology vs occult infection.     -Continued diuresis with LASIX 40 mg IV BID, de-escalated to qd once patient's Bps trended down and was net negative 2.7L in one day  -Given absent fever, normal WBC, and chronic symptom presentation, held off on antibiotic administration and targeted cardiac etiology of presentation. However on 5/27 appeared euvolemic and still requiring O2, so repeated CXR to see if there is an underlying infectious process  -Screening for HIV and thyroid abnormalities WNL  -TTE shows LV EF 50% however moderate RV systolic dysfunction, CVP 15 and PA pressure 69   -Lower extremity US negative for DVT  -Titrate down supplemental oxygen support to goal >92%; avoid hyper-oxygenation.   -Strict I/O's and daily weights.   -Cardiac and 2L fluid restricted diet ordered.     Pulmonary hypertension  Noted on TTE. Likely 2/2 overall volume overload, would need to reassess outpatient once euvolemic.    ROSA (acute  "kidney injury)  Admission notable for BUN/Cr >20 and GFR decline <60 from unknown prior baseline.    -Trending renal function daily, improving with diuresis  -Strict ins and outs  -Avoid nephrotoxic meds    Essential hypertension  Home regimen: Lisinopril    -Continued home lisinopril initially, but held on 5/26 due to normal Bps after aggressive diuresis    Do not resuscitate discussion  Discussion had at bedside regarding possibility of progression of respiratory failure and possible need for mechanical ventilation. Patient stated that he would not want to be "on a breathing machine" or have chest compressions should his heart stop, due to his advanced age. DNR form completed in chart; signed by attending physician.     Coronary artery disease involving native coronary artery of native heart without angina pectoris  Status post stent placement in approximately 2009. No longer on ASA per his cardiologist.     -Continue home Statin    Transaminitis  Suspected secondary to congestive hepatopathy in the setting of new CHF exacerbation.     -Trending liver function daily.   - Improved with diuresis    Atrial fibrillation  - Continuing home Xarelto  - Has been rate controlled so no BB needed    Acute on chronic congestive heart failure  -See assessment for respiratory failure      VTE Risk Mitigation (From admission, onward)         Ordered     rivaroxaban tablet 15 mg  With dinner      05/25/20 0050     IP VTE HIGH RISK PATIENT  Once      05/25/20 0050     Place sequential compression device  Until discontinued      05/25/20 0050                      Rafa Barnard DO  Department of Hospital Medicine   [facility]    "

## 2020-05-27 NOTE — PLAN OF CARE
Pt would benefit from continued skilled acute PT services to improve functional mobility. POC is appropriate and pt should continue towards current goals set to progress to PLOF.       Problem: Physical Therapy Goal  Goal: Physical Therapy Goal  Description  Goals to be met by: 2020    Patient will increase functional independence with mobility by performin. Supine to sit with Modified [place]  2. Sit to supine with Modified [place]  3. Sit to stand transfer with Modified [place]  4. Gait  x 50 feet with Modified [place] using LRAD  5. Lower extremity exercise program x15 reps, with independence    Outcome: Ongoing, Progressing

## 2020-05-27 NOTE — SUBJECTIVE & OBJECTIVE
Interval History: No acute events overnight. Good UOP even after IV lasix frequency decreased from BID to qd. Spent more time up in his chair yesterday and worked with PT/OT. Still requiring O2 and feels dyspneic, supplemental O2 2-3L. No purulent sputum production and afebrile.    Review of Systems   Constitutional: Negative for appetite change, chills, fever and unexpected weight change.   HENT: Negative for sore throat and trouble swallowing.    Eyes: Negative for photophobia and visual disturbance.   Respiratory: Positive for cough and shortness of breath.    Cardiovascular: Negative for chest pain, palpitations and leg swelling.   Gastrointestinal: Negative for abdominal pain, diarrhea, nausea and vomiting.   Genitourinary: Negative for difficulty urinating.   Musculoskeletal: Negative for back pain, myalgias and neck pain.   Skin: Negative for pallor and rash.   Neurological: Negative for light-headedness, numbness and headaches.     Objective:     Vital Signs (Most Recent):  Temp: 98.1 °F (36.7 °C) (05/27/20 0840)  Pulse: 88 (05/27/20 0840)  Resp: 16 (05/27/20 0840)  BP: 123/67 (05/27/20 0840)  SpO2: 96 % (05/27/20 0900) Vital Signs (24h Range):  Temp:  [96.4 °F (35.8 °C)-98.1 °F (36.7 °C)] 98.1 °F (36.7 °C)  Pulse:  [74-99] 88  Resp:  [16-24] 16  SpO2:  [90 %-97 %] 96 %  BP: ()/(42-71) 123/67     Weight: 65.8 kg (145 lb 1 oz)  Body mass index is 20.81 kg/m².    Intake/Output Summary (Last 24 hours) at 5/27/2020 0945  Last data filed at 5/27/2020 0400  Gross per 24 hour   Intake 90 ml   Output 1925 ml   Net -1835 ml      Physical Exam   Constitutional: He is oriented to person, place, and time. He appears well-developed and well-nourished. No distress. Nasal cannula in place.   HENT:   Head: Normocephalic and atraumatic.   Mouth/Throat: Oropharynx is clear and moist and mucous membranes are normal.   Eyes: Pupils are equal, round, and reactive to light. Conjunctivae are normal.   Neck: No JVD present.    Cardiovascular: Normal rate, regular rhythm, normal heart sounds and normal pulses.   Pulmonary/Chest: Effort normal. No respiratory distress. He has decreased breath sounds in the right lower field and the left lower field. He has rales (improved) in the right upper field and the left upper field.   Abdominal: Soft. Normal appearance and bowel sounds are normal. He exhibits no distension. There is no tenderness.   Musculoskeletal: Normal range of motion. He exhibits no edema.   Bilateral pretibial hyperpigmentation c/w stasis dermatitis. There is no tenderness with calf palpation.    Neurological: He is alert and oriented to person, place, and time.   Skin: Skin is warm and dry. No bruising and no rash noted.       Significant Labs:   CBC:   Recent Labs   Lab 05/25/20  1508   WBC 7.58   HGB 14.5   HCT 44.7        CMP:   Recent Labs   Lab 05/26/20  0413 05/27/20  0335    140   K 3.7 4.1   CL 98 99   CO2 37* 35*   GLU 80 82   BUN 21 24*   CREATININE 0.9 0.8   CALCIUM 8.6* 8.6*   PROT 5.3* 5.4*   ALBUMIN 2.2* 2.3*   BILITOT 0.8 0.9   ALKPHOS 123 137*   AST 66* 77*   ALT 45* 55*   ANIONGAP 6* 6*   EGFRNONAA >60.0 >60.0     All pertinent labs within the past 24 hours have been reviewed.    Significant Imaging: I have reviewed all pertinent imaging results/findings within the past 24 hours.

## 2020-05-27 NOTE — ASSESSMENT & PLAN NOTE
Admission notable for BUN/Cr >20 and GFR decline <60 from unknown prior baseline.    -Trending renal function daily, improving with diuresis  -Strict ins and outs  -Avoid nephrotoxic meds

## 2020-05-27 NOTE — PT/OT/SLP PROGRESS
Physical Therapy Treatment    Patient Name:  Boris Garcia   MRN:  [id number]    Recommendations:     Discharge Recommendations:  home health PT   Discharge Equipment Recommendations: none   Barriers to discharge: None    Assessment:     Boris Garcia is a 89 y.o. male admitted with a medical diagnosis of Acute hypoxemic respiratory failure.  He presents with the following impairments/functional limitations:  weakness, gait instability, impaired cardiopulmonary response to activity, impaired endurance, impaired balance, decreased lower extremity function, impaired self care skills, impaired functional mobilty. Pt progressing well with acute therapy and increasing activity tolerance. Pt focused on gait training and static standing balance on this date. Pt performed all activities on 2L O2 and sats decreased to 89% following gait training. Pt would benefit from continued skilled acute PT 4x/wk to improve functional mobility.  Recommending pt receive PT services in PT setting following discharge from hospital once medically cleared.     Rehab Prognosis: Fair; patient would benefit from acute skilled PT services to address these deficits and reach maximum level of function.    Recent Surgery: * No surgery found *      Plan:     During this hospitalization, patient to be seen 4 x/week to address the identified rehab impairments via gait training, therapeutic activities, therapeutic exercises, neuromuscular re-education and progress toward the following goals:    · Plan of Care Expires:  06/25/20    Subjective     Chief Complaint: none noted   Patient/Family Comments/goals: Pt pleasant and willing to participate with therapy on this date.    Pain/Comfort:  · Pain Rating 1: 0/10      Objective:     Communicated with NSG prior to session.  Patient found up in chair with oxygen upon PT entry to room.     General Precautions: Standard, fall   Orthopedic Precautions:N/A   Braces: N/A     Functional Mobility:  · Transfers:      · Sit to Stand:  stand by assistance with rolling walker  · Toilet Transfer: stand by assistance with  rolling walker and grab bars  using  Step Transfer  · Gait: 14ft x2 SBA w/RW on 2L O2 and SPo2 decreasing to 89% following   · Balance: SBA      AM-PAC 6 CLICK MOBILITY  Turning over in bed (including adjusting bedclothes, sheets and blankets)?: 3  Sitting down on and standing up from a chair with arms (e.g., wheelchair, bedside commode, etc.): 3  Moving from lying on back to sitting on the side of the bed?: 3  Moving to and from a bed to a chair (including a wheelchair)?: 3  Need to walk in hospital room?: 3  Climbing 3-5 steps with a railing?: 3  Basic Mobility Total Score: 18       Therapeutic Activities and Exercises:   - Static Standing: ~5mins SBA performing ADLs at the sink   - Pt educated on:   -PT roles, expectations, and POC    -Safety with mobility   -Benefits of OOB activities to increase strength and functional mobility    -Performing ther ex for increasing LE ROM and strength   -Discharge recommendations     Patient left up in chair with all lines intact, call button in reach and NSG notified..    GOALS:   Multidisciplinary Problems     Physical Therapy Goals        Problem: Physical Therapy Goal    Goal Priority Disciplines Outcome Goal Variances Interventions   Physical Therapy Goal     PT, PT/OT Ongoing, Progressing     Description:  Goals to be met by: 2020    Patient will increase functional independence with mobility by performin. Supine to sit with Modified [place]  2. Sit to supine with Modified [place]  3. Sit to stand transfer with Modified [place]  4. Gait  x 50 feet with Modified [place] using LRAD  5. Lower extremity exercise program x15 reps, with independence                     Time Tracking:     PT Received On: 20  PT Start Time: 902     PT Stop Time: 930  PT Total Time (min): 28 min     Billable Minutes: Gait Training 15 and Therapeutic  Activity 13    Treatment Type: Treatment  PT/PTA: PT           Chavez Jones, PT  05/27/2020

## 2020-05-27 NOTE — PLAN OF CARE
05/27/20 0833   Post-Acute Status   Post-Acute Authorization Home Health   Home Health Status Awaiting Internal Medical Clearance

## 2020-05-27 NOTE — ASSESSMENT & PLAN NOTE
Acute on chronic congestive heart failure  HFpEF  This is a 89 year old male with PMH notable for atrial fibrillation (on anticoagulation) and CAD (status post stent placement in 2009) who is presenting on 05/24/2020 with almost two week duration of progressively worsening dyspnea on exertion, orthopnea, and lower extremity edema associated with new diagnosis of respiratory failure requiring supplemental oxygen. His presentation is notable for jayy signs of volume overload on exam, elevated BNP, and CXR showing bilateral interstitial opacities. Differential diagnoses favor respiratory failure secondary to acute heart failure of unclear etiology vs occult infection.     -Continued diuresis with LASIX 40 mg IV BID, de-escalated to qd once patient's Bps trended down and was net negative 2.7L in one day  -Given absent fever, normal WBC, and chronic symptom presentation, held off on antibiotic administration and targeted cardiac etiology of presentation. However on 5/27 appeared euvolemic and still requiring O2, so repeated CXR to see if there is an underlying infectious process  -Screening for HIV and thyroid abnormalities WNL  -TTE shows LV EF 50% however moderate RV systolic dysfunction, CVP 15 and PA pressure 69   -Lower extremity US negative for DVT  -Titrate down supplemental oxygen support to goal >92%; avoid hyper-oxygenation.   -Strict I/O's and daily weights.   -Cardiac and 2L fluid restricted diet ordered.

## 2020-05-27 NOTE — PLAN OF CARE
Problem: Fall Injury Risk  Goal: Absence of Fall and Fall-Related Injury  Outcome: Ongoing, Progressing     Problem: Adult Inpatient Plan of Care  Goal: Plan of Care Review  Outcome: Ongoing, Progressing   Alert and orient times four. Shortness of breath noted briefly with transfer from bed  to chair. O2 SAT was 92 then increase to 97 after laying in bed. -120 with BP machine but reading lower with VISI. No complaint voided voided approximately  475 ml rolo color urine per urinal. No falls this shift.

## 2020-05-27 NOTE — PLAN OF CARE
CM initiated assessment at bedside, pt unable to participate due to medical condition. Emergency contact called for phone interview. CM spoke with daughter ([phone]). Pt lives alone and uses a walker. Pt desires to return home once medically stable.     05/25/20 0839   Discharge Assessment   Assessment Type Discharge Planning Assessment   Confirmed/corrected address and phone number on facesheet? Yes   Assessment information obtained from? Patient   Expected Length of Stay (days) 3   Communicated expected length of stay with patient/caregiver yes   Prior to hospitilization cognitive status: Alert/Oriented   Prior to hospitalization functional status: Assistive Equipment   Current cognitive status: Alert/Oriented   Current Functional Status: Assistive Equipment   Facility Arrived From: home   Lives With alone   Able to Return to Prior Arrangements yes   Is patient able to care for self after discharge? Unable to determine at this time (comments)   Who are your caregiver(s) and their phone number(s)? Eliana Saldaña [phone]    Patient's perception of discharge disposition home or selfcare   Readmission Within the Last 30 Days no previous admission in last 30 days   Patient currently being followed by outpatient case management? No   Patient currently receives any other outside agency services? No   Equipment Currently Used at Home none   Do you have any problems affording any of your prescribed medications? No   Is the patient taking medications as prescribed? yes   Does the patient have transportation home? Yes   Transportation Anticipated family or friend will provide   Does the patient receive services at the [facility]? No   Discharge Plan A Home   Discharge Plan B Home;Home Health   DME Needed Upon Discharge  none   Patient/Family in Agreement with Plan yes   Pippa Orozco, MSN  Case Management  Ext [phone]

## 2020-05-28 LAB
ALBUMIN SERPL BCP-MCNC: 2.4 G/DL (ref 3.5–5.2)
ALP SERPL-CCNC: 146 U/L (ref 55–135)
ALT SERPL W/O P-5'-P-CCNC: 62 U/L (ref 10–44)
ANION GAP SERPL CALC-SCNC: 8 MMOL/L (ref 8–16)
AST SERPL-CCNC: 81 U/L (ref 10–40)
BILIRUB SERPL-MCNC: 1 MG/DL (ref 0.1–1)
BUN SERPL-MCNC: 23 MG/DL (ref 8–23)
CALCIUM SERPL-MCNC: 9 MG/DL (ref 8.7–10.5)
CHLORIDE SERPL-SCNC: 95 MMOL/L (ref 95–110)
CO2 SERPL-SCNC: 36 MMOL/L (ref 23–29)
CREAT SERPL-MCNC: 0.9 MG/DL (ref 0.5–1.4)
EST. GFR  (AFRICAN AMERICAN): >60 ML/MIN/1.73 M^2
EST. GFR  (NON AFRICAN AMERICAN): >60 ML/MIN/1.73 M^2
GLUCOSE SERPL-MCNC: 82 MG/DL (ref 70–110)
MAGNESIUM SERPL-MCNC: 1.9 MG/DL (ref 1.6–2.6)
PHOSPHATE SERPL-MCNC: 2.8 MG/DL (ref 2.7–4.5)
POTASSIUM SERPL-SCNC: 4 MMOL/L (ref 3.5–5.1)
PROT SERPL-MCNC: 6 G/DL (ref 6–8.4)
SODIUM SERPL-SCNC: 139 MMOL/L (ref 136–145)

## 2020-05-28 PROCEDURE — 11000001 HC ACUTE MED/SURG PRIVATE ROOM

## 2020-05-28 PROCEDURE — 83735 ASSAY OF MAGNESIUM: CPT

## 2020-05-28 PROCEDURE — 97116 GAIT TRAINING THERAPY: CPT

## 2020-05-28 PROCEDURE — 80053 COMPREHEN METABOLIC PANEL: CPT

## 2020-05-28 PROCEDURE — 25000003 PHARM REV CODE 250: Performed by: STUDENT IN AN ORGANIZED HEALTH CARE EDUCATION/TRAINING PROGRAM

## 2020-05-28 PROCEDURE — 84100 ASSAY OF PHOSPHORUS: CPT

## 2020-05-28 PROCEDURE — 99232 SBSQ HOSP IP/OBS MODERATE 35: CPT | Mod: ,,, | Performed by: HOSPITALIST

## 2020-05-28 PROCEDURE — 94761 N-INVAS EAR/PLS OXIMETRY MLT: CPT

## 2020-05-28 PROCEDURE — 27000221 HC OXYGEN, UP TO 24 HOURS

## 2020-05-28 PROCEDURE — 36415 COLL VENOUS BLD VENIPUNCTURE: CPT

## 2020-05-28 PROCEDURE — 99232 PR SUBSEQUENT HOSPITAL CARE,LEVL II: ICD-10-PCS | Mod: ,,, | Performed by: HOSPITALIST

## 2020-05-28 RX ORDER — FUROSEMIDE 40 MG/1
40 TABLET ORAL DAILY
Status: DISCONTINUED | OUTPATIENT
Start: 2020-05-28 | End: 2020-05-29 | Stop reason: HOSPADM

## 2020-05-28 RX ADMIN — RIVAROXABAN 15 MG: 15 TABLET, FILM COATED ORAL at 05:05

## 2020-05-28 RX ADMIN — SIMVASTATIN 40 MG: 40 TABLET, FILM COATED ORAL at 09:05

## 2020-05-28 RX ADMIN — LEVOFLOXACIN 750 MG: 500 TABLET, FILM COATED ORAL at 08:05

## 2020-05-28 RX ADMIN — FUROSEMIDE 40 MG: 40 TABLET ORAL at 08:05

## 2020-05-28 NOTE — PLAN OF CARE
CM spoke with  POA regarding HH, will make a decision after speaking with siblings. Will cont to follow.  Pippa Orozco, MSN  Case Management  Ext [phone]

## 2020-05-28 NOTE — PLAN OF CARE
Pt would benefit from continued skilled acute PT services to improve functional mobility. POC is appropriate and pt should continue towards current goals set to progress to PLOF.       Problem: Physical Therapy Goal  Goal: Physical Therapy Goal  Description  Goals to be met by: 2020    Patient will increase functional independence with mobility by performin. Supine to sit with Modified [place]  2. Sit to supine with Modified [place]  3. Sit to stand transfer with Modified [place]  4. Gait  x 50 feet with Modified [place] using LRAD  5. Lower extremity exercise program x15 reps, with independence    Outcome: Ongoing, Progressing

## 2020-05-28 NOTE — PT/OT/SLP PROGRESS
Physical Therapy Treatment    Patient Name:  Boris Garcia   MRN:  [id number]    Recommendations:     Discharge Recommendations:  home health PT   Discharge Equipment Recommendations: none   Barriers to discharge: None    Assessment:     Boris Garcia is a 89 y.o. male admitted with a medical diagnosis of Acute hypoxemic respiratory failure.  He presents with the following impairments/functional limitations:  weakness, gait instability, impaired cardiopulmonary response to activity, impaired endurance, decreased lower extremity function, impaired self care skills, impaired functional mobilty. Pt progressing well towards goals set as pt increasing amb distance requiring decreased levels of assist. Pt is progressing towards home without therapy needs but would benefit from continued skilled acute PT 4x/wk to improve functional mobility.  Recommending pt receive PT services in  setting following discharge from hospital once medically cleared.     Rehab Prognosis: Good; patient would benefit from acute skilled PT services to address these deficits and reach maximum level of function.    Recent Surgery: * No surgery found *      Plan:     During this hospitalization, patient to be seen 4 x/week to address the identified rehab impairments via gait training, therapeutic activities, therapeutic exercises, neuromuscular re-education and progress toward the following goals:    · Plan of Care Expires:  06/25/20    Subjective     Chief Complaint: none noted   Patient/Family Comments/goals: Pt pleasant and willing to participate with therapy on this date.    Pain/Comfort:  · Pain Rating 1: 0/10      Objective:     Communicated with NSG prior to session.  Patient found up in chair with oxygen upon PT entry to room.     General Precautions: Standard, fall   Orthopedic Precautions:N/A   Braces: N/A     Functional Mobility:  · Transfers:     · Sit to Stand:  supervision with rolling walker  · Gait: 120ft SBA w/RW on 2L O2. pt  demonstrated flexed posture w/decreased ken and short shuffling steps but no LOB noted and good safety awareness  · Balance: SBA      AM-PAC 6 CLICK MOBILITY  Turning over in bed (including adjusting bedclothes, sheets and blankets)?: 3  Sitting down on and standing up from a chair with arms (e.g., wheelchair, bedside commode, etc.): 3  Moving from lying on back to sitting on the side of the bed?: 3  Moving to and from a bed to a chair (including a wheelchair)?: 3  Need to walk in hospital room?: 3  Climbing 3-5 steps with a railing?: 3  Basic Mobility Total Score: 18       Therapeutic Activities and Exercises:   - Pt educated on:   -PT roles, expectations, and POC    -Safety with mobility   -Benefits of OOB activities to increase strength and functional mobility    -Performing ther ex for increasing LE ROM and strength   -Discharge recommendations     Patient left up in chair with all lines intact and call button in reach..    GOALS:   Multidisciplinary Problems     Physical Therapy Goals        Problem: Physical Therapy Goal    Goal Priority Disciplines Outcome Goal Variances Interventions   Physical Therapy Goal     PT, PT/OT Ongoing, Progressing     Description:  Goals to be met by: 2020    Patient will increase functional independence with mobility by performin. Supine to sit with Modified [place]  2. Sit to supine with Modified [place]  3. Sit to stand transfer with Modified [place]  4. Gait  x 50 feet with Modified [place] using LRAD  5. Lower extremity exercise program x15 reps, with independence                     Time Tracking:     PT Received On: 20  PT Start Time: 919     PT Stop Time: 942  PT Total Time (min): 23 min     Billable Minutes: Gait Training 23    Treatment Type: Treatment  PT/PTA: PT           Chavez Jones, PT  2020

## 2020-05-28 NOTE — PT/OT/SLP PROGRESS
Occupational Therapy      Patient Name:  Boris Garcia   MRN:  [id number]    Patient not seen today for OT services. Per chart review, pt remains appropriate for continued OT. Will follow up as schedule allows.    Yajaira Rueda OT  5/28/2020

## 2020-05-28 NOTE — ASSESSMENT & PLAN NOTE
Acute on chronic congestive heart failure  HFpEF  Community acquired pneumonia  This is a 89 year old male with PMH notable for atrial fibrillation (on anticoagulation) and CAD (status post stent placement in 2009) who is presenting on 05/24/2020 with almost two week duration of progressively worsening dyspnea on exertion, orthopnea, and lower extremity edema associated with new diagnosis of respiratory failure requiring supplemental oxygen. His presentation is notable for jayy signs of volume overload on exam, elevated BNP, and CXR showing bilateral interstitial opacities. Differential diagnoses favor respiratory failure secondary to acute heart failure of unclear etiology vs occult infection.     -Continued diuresis with lasix 40 mg IV BID, de-escalated to PO qd once patient's Bps trended down and was net negative 2.7L in one day  -Given absent fever, normal WBC, and chronic symptom presentation, held off on antibiotic administration and targeted cardiac etiology of presentation. However on 5/27 appeared euvolemic and still requiring O2, so repeated CXR to see if there is an underlying infectious process. Upper lobes bilat with reticulation and not clearly from infection, but patient was euvolemic and still requiring O2, so we added levofloxacin on 5/27 with plan for 5 day treatment for CAP  -Screening for HIV and thyroid abnormalities WNL  -TTE shows LV EF 50% however moderate RV systolic dysfunction, CVP 15 and PA pressure 69   -Lower extremity US negative for DVT  -Titrate down supplemental oxygen support to goal >92%; avoid hyper-oxygenation.   -Strict I/O's and daily weights.   -Cardiac and 2L fluid restricted diet ordered.

## 2020-05-28 NOTE — PLAN OF CARE
Problem: Fall Injury Risk  Goal: Absence of Fall and Fall-Related Injury  Outcome: Ongoing, Progressing     Problem: Adult Inpatient Plan of Care  Goal: Plan of Care Review  Outcome: Ongoing, Progressing  Goal: Patient-Specific Goal (Individualization)  Outcome: Ongoing, Progressing  Goal: Absence of Hospital-Acquired Illness or Injury  Outcome: Ongoing, Progressing  Goal: Optimal Comfort and Wellbeing  Outcome: Ongoing, Progressing  Goal: Readiness for Transition of Care  Outcome: Ongoing, Progressing  Goal: Rounds/Family Conference  Outcome: Ongoing, Progressing     Problem: Electrolyte Imbalance (Acute Kidney Injury/Impairment)  Goal: Serum Electrolyte Balance  Outcome: Ongoing, Progressing     Problem: Fluid Imbalance (Acute Kidney Injury/Impairment)  Goal: Optimal Fluid Balance  Outcome: Ongoing, Progressing     Problem: Hematologic Alteration (Acute Kidney Injury/Impairment)  Goal: Hemoglobin, Hematocrit and Platelets Within Normal Range  Outcome: Ongoing, Progressing     Problem: Oral Intake Inadequate (Acute Kidney Injury/Impairment)  Goal: Optimal Nutrition Intake  Outcome: Ongoing, Progressing     Problem: Renal Function Impairment (Acute Kidney Injury/Impairment)  Goal: Effective Renal Function  Outcome: Ongoing, Progressing     Problem: Skin Injury Risk Increased  Goal: Skin Health and Integrity  Outcome: Ongoing, Progressing     Pt aaox4. V/s stable. No complaints. Telemtry d/c. O2 titrated. Will continue to monitor.

## 2020-05-28 NOTE — PROGRESS NOTES
[facility] Hospital Medicine  Progress Note    Patient Name: Boris Garcia  MRN: [id number]  Patient Class: IP- Inpatient   Admission Date: 5/24/2020  Length of Stay: 3 days  Attending Physician: Hector Funk MD  Primary Care Provider: To Obtain Unable    [facility] Medicine Team: [facility] HOSP MED 3 Rafa Barnard DO    Subjective:     Principal Problem:Acute hypoxemic respiratory failure        HPI:  Mr. Boris Garcia is a 89 year old male presenting with a chief complaint of SOB. He has a PMH significant for atrial fibrillation (on anticoagulation) and CAD (status post stent placement). The patient reports an approximately two to three week duration of progressively worsening SOB with exertion and laying flat. Symptom is also associated with non-productive cough during episodes of SOB and progressively worsening bilateral lower extremity swelling since symptom onset. He reports symptoms are relieved by rest and sleeping on multiple pillows at night; he is currently requiring 2-3 pillows in order to minimize symptoms. He does not weigh himself and is unsure if he has gained weight. He reports having one stent placed in his heart in approximately 2009, however denies prior history of MI. He also denies symptom association with chest pain, palpitations, fevers, chills, nausea, vomiting, abdominal pain or distention, difficulty urinating or decrease in urination, or prior episodes of similar SOB. He follows with a cardiologist at [facility] (Dr. Harpreet Diaz) and reports making an appointment with him for evaluation of above symptoms, however due to progression of symptoms his daughter contacted EMS after noticing patient appearing SOB. Upon arrival, EMS noted patient to have oxygen saturations in the mid-80's and he was transported to ED here for further care.     At his baseline, he reports living alone and not requiring assistance with ambulation. He is originally from [place], but gets the  majority of his medical care at [facility]. His daughter (Eliana Saldaña) is his power of .     ED course: On arrival, his vital signs were significant for tachycardia (120) with respiratory status stable on 3L. Labs were notable for normal WBC, hyperkalemia (5.5), ROSA (BUN 25, Cr 1.1), transaminitis (, ALT 58), elevated BNP (223), and mildly elevated troponin (0.03). He tested negative for COVID-19. CXR was suggestive of bilateral infiltrates. He received Azithromycin, Ceftriaxone, and Lasix and was admitted to hospital medicine for further management.     Overview/Hospital Course:  Pt diuresing well on 40mg IV lasix BID w/ good UOP and improving SOB. LFTs also improving. BUN/Cr improving. Weaning oxygen. 5/25 2D echo w/ EF 50%, reduced RV systolic function, mild MV regurg, and pulm HTN. TSH and A1C WNL, HIV negative, B12 elevated but smear WNL. Transitioned to PO lasix on 5/27 and O2 weaned to 2L with good UOP. Levofloxacin started on 5/27 for CAP as patient became euvolemic but still requiring O2.    Interval History: No acute events overnight. Excellent urinary output with oral furosemide yesterday. Subjectively his dyspnea feels better and was able to tolerate sitting up in his chair for a larger portion of the day. Continued pulmonary toilet.    Review of Systems   Constitutional: Negative for appetite change, chills, fever and unexpected weight change.   HENT: Negative for sore throat and trouble swallowing.    Eyes: Negative for photophobia and visual disturbance.   Respiratory: Positive for cough and shortness of breath.    Cardiovascular: Negative for chest pain, palpitations and leg swelling.   Gastrointestinal: Negative for abdominal pain, diarrhea, nausea and vomiting.   Genitourinary: Negative for difficulty urinating.   Musculoskeletal: Negative for back pain, myalgias and neck pain.   Skin: Negative for pallor and rash.   Neurological: Negative for light-headedness, numbness and headaches.      Objective:     Vital Signs (Most Recent):  Temp: 96.9 °F (36.1 °C) (05/28/20 1622)  Pulse: 98 (05/28/20 1622)  Resp: 16 (05/28/20 1622)  BP: (!) 105/52 (05/28/20 1622)  SpO2: 95 % (05/28/20 1622) Vital Signs (24h Range):  Temp:  [96.4 °F (35.8 °C)-98.8 °F (37.1 °C)] 96.9 °F (36.1 °C)  Pulse:  [] 98  Resp:  [16-25] 16  SpO2:  [90 %-96 %] 95 %  BP: (103-123)/(52-73) 105/52     Weight: 65.8 kg (145 lb 1 oz)  Body mass index is 20.81 kg/m².    Intake/Output Summary (Last 24 hours) at 5/28/2020 1655  Last data filed at 5/28/2020 0328  Gross per 24 hour   Intake 85 ml   Output 1400 ml   Net -1315 ml      Physical Exam   Constitutional: He is oriented to person, place, and time. He appears well-developed and well-nourished. No distress. Nasal cannula in place.   HENT:   Head: Normocephalic and atraumatic.   Mouth/Throat: Oropharynx is clear and moist and mucous membranes are normal.   Eyes: Pupils are equal, round, and reactive to light. Conjunctivae are normal.   Neck: No JVD present.   Cardiovascular: Normal rate, regular rhythm, normal heart sounds and normal pulses.   Pulmonary/Chest: Effort normal. No respiratory distress. He has decreased breath sounds in the right lower field and the left lower field. He has rales (improved) in the right upper field and the left upper field.   Abdominal: Soft. Normal appearance and bowel sounds are normal. He exhibits no distension. There is no tenderness.   Musculoskeletal: Normal range of motion. He exhibits no edema.   Bilateral pretibial hyperpigmentation c/w stasis dermatitis. There is no tenderness with calf palpation.    Neurological: He is alert and oriented to person, place, and time.   Skin: Skin is warm and dry. No bruising and no rash noted.       Significant Labs:   CMP:   Recent Labs   Lab 05/27/20  0335 05/28/20  0402    139   K 4.1 4.0   CL 99 95   CO2 35* 36*   GLU 82 82   BUN 24* 23   CREATININE 0.8 0.9   CALCIUM 8.6* 9.0   PROT 5.4* 6.0   ALBUMIN  2.3* 2.4*   BILITOT 0.9 1.0   ALKPHOS 137* 146*   AST 77* 81*   ALT 55* 62*   ANIONGAP 6* 8   EGFRNONAA >60.0 >60.0     All pertinent labs within the past 24 hours have been reviewed.    Significant Imaging: I have reviewed all pertinent imaging results/findings within the past 24 hours.      Assessment/Plan:      * Acute hypoxemic respiratory failure  Acute on chronic congestive heart failure  HFpEF  Community acquired pneumonia  This is a 89 year old male with PMH notable for atrial fibrillation (on anticoagulation) and CAD (status post stent placement in 2009) who is presenting on 05/24/2020 with almost two week duration of progressively worsening dyspnea on exertion, orthopnea, and lower extremity edema associated with new diagnosis of respiratory failure requiring supplemental oxygen. His presentation is notable for jayy signs of volume overload on exam, elevated BNP, and CXR showing bilateral interstitial opacities. Differential diagnoses favor respiratory failure secondary to acute heart failure of unclear etiology vs occult infection.     -Continued diuresis with lasix 40 mg IV BID, de-escalated to PO qd once patient's Bps trended down and was net negative 2.7L in one day  -Given absent fever, normal WBC, and chronic symptom presentation, held off on antibiotic administration and targeted cardiac etiology of presentation. However on 5/27 appeared euvolemic and still requiring O2, so repeated CXR to see if there is an underlying infectious process. Upper lobes bilat with reticulation and not clearly from infection, but patient was euvolemic and still requiring O2, so we added levofloxacin on 5/27 with plan for 5 day treatment for CAP  -Screening for HIV and thyroid abnormalities WNL  -TTE shows LV EF 50% however moderate RV systolic dysfunction, CVP 15 and PA pressure 69   -Lower extremity US negative for DVT  -Titrate down supplemental oxygen support to goal >92%; avoid hyper-oxygenation.   -Strict I/O's  "and daily weights.   -Cardiac and 2L fluid restricted diet ordered.     Pulmonary hypertension  Noted on TTE. Likely 2/2 overall volume overload, would need to reassess outpatient once euvolemic.    ROSA (acute kidney injury)  Admission notable for BUN/Cr >20 and GFR decline <60 from unknown prior baseline.    -Trending renal function daily, improving with diuresis  -Strict ins and outs  -Avoid nephrotoxic meds    Essential hypertension  Home regimen: Lisinopril    -Continued home lisinopril initially, but held on 5/26 due to normal Bps after aggressive diuresis    Do not resuscitate discussion  Discussion had at bedside regarding possibility of progression of respiratory failure and possible need for mechanical ventilation. Patient stated that he would not want to be "on a breathing machine" or have chest compressions should his heart stop, due to his advanced age. DNR form completed in chart; signed by attending physician.     Coronary artery disease involving native coronary artery of native heart without angina pectoris  Status post stent placement in approximately 2009. No longer on ASA per his cardiologist.     -Continue home Statin    Transaminitis  Suspected secondary to congestive hepatopathy in the setting of new CHF exacerbation.     -Trending liver function daily.   - Improved with diuresis    Atrial fibrillation  - Continuing home Xarelto  - Has been rate controlled so no BB needed      VTE Risk Mitigation (From admission, onward)         Ordered     rivaroxaban tablet 15 mg  With dinner      05/25/20 0050     IP VTE HIGH RISK PATIENT  Once      05/25/20 0050     Place sequential compression device  Until discontinued      05/25/20 0050                      Rafa Barnard DO  Department of Hospital Medicine   [facility]    "

## 2020-05-28 NOTE — PLAN OF CARE
CM called POA for preference of HH agency. Pt will d/c home when medically ready     05/28/20 0956   Discharge Reassessment   Assessment Type Discharge Planning Reassessment   Provided patient/caregiver education on the expected discharge date and the discharge plan Yes   Do you have any problems affording any of your prescribed medications? No   Discharge Plan A Home Health;Home with family   Discharge Plan B Home with family   DME Needed Upon Discharge  none   Patient choice form signed by patient/caregiver Yes   Anticipated Discharge Disposition Home-Health   Can the patient/caregiver answer the patient profile reliably? Yes, cognitively intact   How does the patient rate their overall health at the present time? Fair   Describe the patient's ability to walk at the present time. Minor restrictions or changes   How often would a person be available to care for the patient? Often   Number of comorbid conditions (as recorded on the chart) Four   Post-Acute Status   Post-Acute Authorization Home Health   Home Health Status Awaiting Internal Medical Clearance   SABRINA Oden  Case Management  Ext [phone]

## 2020-05-28 NOTE — PLAN OF CARE
05/28/20 0839   Post-Acute Status   Post-Acute Authorization Home Health   Home Health Status Awaiting Internal Medical Clearance

## 2020-05-28 NOTE — SUBJECTIVE & OBJECTIVE
Interval History: No acute events overnight. Excellent urinary output with oral furosemide yesterday. Subjectively his dyspnea feels better and was able to tolerate sitting up in his chair for a larger portion of the day. Continued pulmonary toilet.    Review of Systems   Constitutional: Negative for appetite change, chills, fever and unexpected weight change.   HENT: Negative for sore throat and trouble swallowing.    Eyes: Negative for photophobia and visual disturbance.   Respiratory: Positive for cough and shortness of breath.    Cardiovascular: Negative for chest pain, palpitations and leg swelling.   Gastrointestinal: Negative for abdominal pain, diarrhea, nausea and vomiting.   Genitourinary: Negative for difficulty urinating.   Musculoskeletal: Negative for back pain, myalgias and neck pain.   Skin: Negative for pallor and rash.   Neurological: Negative for light-headedness, numbness and headaches.     Objective:     Vital Signs (Most Recent):  Temp: 96.9 °F (36.1 °C) (05/28/20 1622)  Pulse: 98 (05/28/20 1622)  Resp: 16 (05/28/20 1622)  BP: (!) 105/52 (05/28/20 1622)  SpO2: 95 % (05/28/20 1622) Vital Signs (24h Range):  Temp:  [96.4 °F (35.8 °C)-98.8 °F (37.1 °C)] 96.9 °F (36.1 °C)  Pulse:  [] 98  Resp:  [16-25] 16  SpO2:  [90 %-96 %] 95 %  BP: (103-123)/(52-73) 105/52     Weight: 65.8 kg (145 lb 1 oz)  Body mass index is 20.81 kg/m².    Intake/Output Summary (Last 24 hours) at 5/28/2020 1655  Last data filed at 5/28/2020 0328  Gross per 24 hour   Intake 85 ml   Output 1400 ml   Net -1315 ml      Physical Exam   Constitutional: He is oriented to person, place, and time. He appears well-developed and well-nourished. No distress. Nasal cannula in place.   HENT:   Head: Normocephalic and atraumatic.   Mouth/Throat: Oropharynx is clear and moist and mucous membranes are normal.   Eyes: Pupils are equal, round, and reactive to light. Conjunctivae are normal.   Neck: No JVD present.   Cardiovascular: Normal  rate, regular rhythm, normal heart sounds and normal pulses.   Pulmonary/Chest: Effort normal. No respiratory distress. He has decreased breath sounds in the right lower field and the left lower field. He has rales (improved) in the right upper field and the left upper field.   Abdominal: Soft. Normal appearance and bowel sounds are normal. He exhibits no distension. There is no tenderness.   Musculoskeletal: Normal range of motion. He exhibits no edema.   Bilateral pretibial hyperpigmentation c/w stasis dermatitis. There is no tenderness with calf palpation.    Neurological: He is alert and oriented to person, place, and time.   Skin: Skin is warm and dry. No bruising and no rash noted.       Significant Labs:   CMP:   Recent Labs   Lab 05/27/20  0335 05/28/20  0402    139   K 4.1 4.0   CL 99 95   CO2 35* 36*   GLU 82 82   BUN 24* 23   CREATININE 0.8 0.9   CALCIUM 8.6* 9.0   PROT 5.4* 6.0   ALBUMIN 2.3* 2.4*   BILITOT 0.9 1.0   ALKPHOS 137* 146*   AST 77* 81*   ALT 55* 62*   ANIONGAP 6* 8   EGFRNONAA >60.0 >60.0     All pertinent labs within the past 24 hours have been reviewed.    Significant Imaging: I have reviewed all pertinent imaging results/findings within the past 24 hours.

## 2020-05-28 NOTE — PLAN OF CARE
Problem: Fall Injury Risk  Goal: Absence of Fall and Fall-Related Injury  Outcome: Ongoing, Progressing     Problem: Adult Inpatient Plan of Care  Goal: Plan of Care Review  Outcome: Ongoing, Progressing  Goal: Patient-Specific Goal (Individualization)  Outcome: Ongoing, Progressing  Goal: Optimal Comfort and Wellbeing  Outcome: Ongoing, Progressing    Patient aaox4, visi in place. Vitals stable. 02 @ 2L/min via nc. Free from falls and injuries during the night. Urinal at bedside for voiding. 20g left forearm IV patent and intact, dressing CD&I. No concerns or requests voiced. Bed in low position with side rails up x2 and call light within reach. Will continue to monitor.

## 2020-05-29 VITALS
RESPIRATION RATE: 18 BRPM | HEIGHT: 70 IN | TEMPERATURE: 97 F | OXYGEN SATURATION: 95 % | HEART RATE: 94 BPM | DIASTOLIC BLOOD PRESSURE: 67 MMHG | SYSTOLIC BLOOD PRESSURE: 108 MMHG | WEIGHT: 145.06 LBS | BODY MASS INDEX: 20.77 KG/M2

## 2020-05-29 LAB
ALBUMIN SERPL BCP-MCNC: 2.3 G/DL (ref 3.5–5.2)
ALP SERPL-CCNC: 135 U/L (ref 55–135)
ALT SERPL W/O P-5'-P-CCNC: 60 U/L (ref 10–44)
ANION GAP SERPL CALC-SCNC: 7 MMOL/L (ref 8–16)
AST SERPL-CCNC: 74 U/L (ref 10–40)
BASOPHILS # BLD AUTO: 0.03 K/UL (ref 0–0.2)
BASOPHILS NFR BLD: 0.5 % (ref 0–1.9)
BILIRUB SERPL-MCNC: 0.8 MG/DL (ref 0.1–1)
BUN SERPL-MCNC: 29 MG/DL (ref 8–23)
CALCIUM SERPL-MCNC: 8.9 MG/DL (ref 8.7–10.5)
CHLORIDE SERPL-SCNC: 96 MMOL/L (ref 95–110)
CO2 SERPL-SCNC: 37 MMOL/L (ref 23–29)
CREAT SERPL-MCNC: 1.1 MG/DL (ref 0.5–1.4)
DIFFERENTIAL METHOD: ABNORMAL
EOSINOPHIL # BLD AUTO: 0.3 K/UL (ref 0–0.5)
EOSINOPHIL NFR BLD: 4.9 % (ref 0–8)
ERYTHROCYTE [DISTWIDTH] IN BLOOD BY AUTOMATED COUNT: 13.3 % (ref 11.5–14.5)
EST. GFR  (AFRICAN AMERICAN): >60 ML/MIN/1.73 M^2
EST. GFR  (NON AFRICAN AMERICAN): 58.8 ML/MIN/1.73 M^2
GLUCOSE SERPL-MCNC: 75 MG/DL (ref 70–110)
HCT VFR BLD AUTO: 46.6 % (ref 40–54)
HGB BLD-MCNC: 14.4 G/DL (ref 14–18)
IMM GRANULOCYTES # BLD AUTO: 0.02 K/UL (ref 0–0.04)
IMM GRANULOCYTES NFR BLD AUTO: 0.3 % (ref 0–0.5)
LYMPHOCYTES # BLD AUTO: 1 K/UL (ref 1–4.8)
LYMPHOCYTES NFR BLD: 16.9 % (ref 18–48)
MAGNESIUM SERPL-MCNC: 1.9 MG/DL (ref 1.6–2.6)
MCH RBC QN AUTO: 32.5 PG (ref 27–31)
MCHC RBC AUTO-ENTMCNC: 30.9 G/DL (ref 32–36)
MCV RBC AUTO: 105 FL (ref 82–98)
MONOCYTES # BLD AUTO: 1.1 K/UL (ref 0.3–1)
MONOCYTES NFR BLD: 18.8 % (ref 4–15)
NEUTROPHILS # BLD AUTO: 3.4 K/UL (ref 1.8–7.7)
NEUTROPHILS NFR BLD: 58.6 % (ref 38–73)
NRBC BLD-RTO: 0 /100 WBC
PHOSPHATE SERPL-MCNC: 2.7 MG/DL (ref 2.7–4.5)
PLATELET # BLD AUTO: 274 K/UL (ref 150–350)
PMV BLD AUTO: 9.7 FL (ref 9.2–12.9)
POTASSIUM SERPL-SCNC: 3.8 MMOL/L (ref 3.5–5.1)
PROT SERPL-MCNC: 5.7 G/DL (ref 6–8.4)
RBC # BLD AUTO: 4.43 M/UL (ref 4.6–6.2)
SODIUM SERPL-SCNC: 140 MMOL/L (ref 136–145)
WBC # BLD AUTO: 5.73 K/UL (ref 3.9–12.7)

## 2020-05-29 PROCEDURE — 36415 COLL VENOUS BLD VENIPUNCTURE: CPT

## 2020-05-29 PROCEDURE — 97535 SELF CARE MNGMENT TRAINING: CPT

## 2020-05-29 PROCEDURE — 99238 HOSP IP/OBS DSCHRG MGMT 30/<: CPT | Mod: ,,, | Performed by: HOSPITALIST

## 2020-05-29 PROCEDURE — 80053 COMPREHEN METABOLIC PANEL: CPT

## 2020-05-29 PROCEDURE — 83735 ASSAY OF MAGNESIUM: CPT

## 2020-05-29 PROCEDURE — 84100 ASSAY OF PHOSPHORUS: CPT

## 2020-05-29 PROCEDURE — 25000003 PHARM REV CODE 250: Performed by: STUDENT IN AN ORGANIZED HEALTH CARE EDUCATION/TRAINING PROGRAM

## 2020-05-29 PROCEDURE — 99238 PR HOSPITAL DISCHARGE DAY,<30 MIN: ICD-10-PCS | Mod: ,,, | Performed by: HOSPITALIST

## 2020-05-29 PROCEDURE — A4216 STERILE WATER/SALINE, 10 ML: HCPCS | Performed by: STUDENT IN AN ORGANIZED HEALTH CARE EDUCATION/TRAINING PROGRAM

## 2020-05-29 PROCEDURE — 97116 GAIT TRAINING THERAPY: CPT

## 2020-05-29 PROCEDURE — 85025 COMPLETE CBC W/AUTO DIFF WBC: CPT

## 2020-05-29 RX ORDER — LEVOFLOXACIN 750 MG/1
750 TABLET ORAL DAILY
Qty: 2 TABLET | Refills: 0 | Status: SHIPPED | OUTPATIENT
Start: 2020-05-30 | End: 2020-05-29

## 2020-05-29 RX ORDER — LEVOFLOXACIN 750 MG/1
750 TABLET ORAL EVERY OTHER DAY
Qty: 1 TABLET | Refills: 0 | Status: SHIPPED | OUTPATIENT
Start: 2020-05-31 | End: 2020-06-02

## 2020-05-29 RX ORDER — FUROSEMIDE 40 MG/1
40 TABLET ORAL DAILY
Qty: 30 TABLET | Refills: 3 | Status: SHIPPED | OUTPATIENT
Start: 2020-05-30 | End: 2020-06-30 | Stop reason: SDUPTHER

## 2020-05-29 RX ADMIN — FUROSEMIDE 40 MG: 40 TABLET ORAL at 08:05

## 2020-05-29 RX ADMIN — LEVOFLOXACIN 750 MG: 500 TABLET, FILM COATED ORAL at 08:05

## 2020-05-29 RX ADMIN — Medication 10 ML: at 08:05

## 2020-05-29 NOTE — SUBJECTIVE & OBJECTIVE
Interval History: No acute events overnight. Subjectively denies SOB today. Continued pulmonary toilet. Continues to produce good UOP.    Review of Systems   Constitutional: Negative for appetite change, chills, fever and unexpected weight change.   HENT: Negative for sore throat and trouble swallowing.    Eyes: Negative for photophobia and visual disturbance.   Respiratory: Negative for cough and shortness of breath.    Cardiovascular: Negative for chest pain, palpitations and leg swelling.   Gastrointestinal: Negative for abdominal pain, diarrhea, nausea and vomiting.   Genitourinary: Negative for difficulty urinating.   Musculoskeletal: Negative for back pain, myalgias and neck pain.   Skin: Negative for pallor and rash.   Neurological: Negative for light-headedness, numbness and headaches.     Objective:     Vital Signs (Most Recent):  Temp: 97.4 °F (36.3 °C) (05/29/20 1503)  Pulse: 94 (05/29/20 1503)  Resp: 18 (05/29/20 1503)  BP: 108/67 (05/29/20 1503)  SpO2: (!) 94 % (05/29/20 1503) Vital Signs (24h Range):  Temp:  [96.9 °F (36.1 °C)-98.4 °F (36.9 °C)] 97.4 °F (36.3 °C)  Pulse:  [] 94  Resp:  [16-26] 18  SpO2:  [90 %-97 %] 94 %  BP: ()/(52-71) 108/67     Weight: 65.8 kg (145 lb 1 oz)  Body mass index is 20.81 kg/m².    Intake/Output Summary (Last 24 hours) at 5/29/2020 1549  Last data filed at 5/29/2020 0600  Gross per 24 hour   Intake --   Output 790 ml   Net -790 ml      Physical Exam   Constitutional: He is oriented to person, place, and time. He appears well-developed and well-nourished. No distress. Nasal cannula in place.   HENT:   Head: Normocephalic and atraumatic.   Mouth/Throat: Oropharynx is clear and moist and mucous membranes are normal.   Eyes: Pupils are equal, round, and reactive to light. Conjunctivae are normal.   Neck: No JVD present.   Cardiovascular: Normal rate, regular rhythm, normal heart sounds and normal pulses.   Pulmonary/Chest: Effort normal. No tachypnea. No  respiratory distress. Rales: improved.   BL LL fine crackles   Abdominal: Soft. Normal appearance and bowel sounds are normal. He exhibits no distension. There is no tenderness.   Musculoskeletal: Normal range of motion. He exhibits no edema.   Bilateral pretibial hyperpigmentation c/w stasis dermatitis. There is no tenderness with calf palpation.    Neurological: He is alert and oriented to person, place, and time.   Skin: Skin is warm and dry. No bruising and no rash noted.       Significant Labs:   CMP:   Recent Labs   Lab 05/28/20  0402 05/29/20  0406    140   K 4.0 3.8   CL 95 96   CO2 36* 37*   GLU 82 75   BUN 23 29*   CREATININE 0.9 1.1   CALCIUM 9.0 8.9   PROT 6.0 5.7*   ALBUMIN 2.4* 2.3*   BILITOT 1.0 0.8   ALKPHOS 146* 135   AST 81* 74*   ALT 62* 60*   ANIONGAP 8 7*   EGFRNONAA >60.0 58.8*     All pertinent labs within the past 24 hours have been reviewed.    Significant Imaging: I have reviewed all pertinent imaging results/findings within the past 24 hours.

## 2020-05-29 NOTE — PLAN OF CARE
Problem: Occupational Therapy Goal  Goal: Occupational Therapy Goal  Description  Goals to be met by: 6/9/2020     Patient will increase functional independence with ADLs by performing:    UE Dressing with Set-up Assistance.  Grooming while standing at sink with Modified [place].  Stand pivot transfers with Modified [place].  Toilet transfer to toilet with Modified [place].     Outcome: Ongoing, Progressing   Patient's goals are appropriate.   FRITZ Acosta  5/29/2020

## 2020-05-29 NOTE — DISCHARGE SUMMARY
[facility] Hospital Medicine  Discharge Summary      Patient Name: Boris Garcia  MRN: [id number]  Admission Date: 5/24/2020  Hospital Length of Stay: 4 days  Discharge Date and Time: 05/29/2020 1610  Attending Physician: Hector Funk MD   Discharging Provider: Vijaya Alex MD  Primary Care Provider: To Obtain Unable  [facility] Medicine Team: [facility]OSP MED 3 Vijaya Alex MD    HPI:   Mr. Boris Garcia is a 89 year old male presenting with a chief complaint of SOB. He has a PMH significant for atrial fibrillation (on anticoagulation) and CAD (status post stent placement). The patient reports an approximately two to three week duration of progressively worsening SOB with exertion and laying flat. Symptom is also associated with non-productive cough during episodes of SOB and progressively worsening bilateral lower extremity swelling since symptom onset. He reports symptoms are relieved by rest and sleeping on multiple pillows at night; he is currently requiring 2-3 pillows in order to minimize symptoms. He does not weigh himself and is unsure if he has gained weight. He reports having one stent placed in his heart in approximately 2009, however denies prior history of MI. He also denies symptom association with chest pain, palpitations, fevers, chills, nausea, vomiting, abdominal pain or distention, difficulty urinating or decrease in urination, or prior episodes of similar SOB. He follows with a cardiologist at [facility] (Dr. Harpreet Diaz) and reports making an appointment with him for evaluation of above symptoms, however due to progression of symptoms his daughter contacted EMS after noticing patient appearing SOB. Upon arrival, EMS noted patient to have oxygen saturations in the mid-80's and he was transported to ED here for further care.     At his baseline, he reports living alone and not requiring assistance with ambulation. He is originally from [place], but gets the majority of his  medical care at [facility]. His daughter (Eliana Saldaña) is his power of .     ED course: On arrival, his vital signs were significant for tachycardia (120) with respiratory status stable on 3L. Labs were notable for normal WBC, hyperkalemia (5.5), ROSA (BUN 25, Cr 1.1), transaminitis (, ALT 58), elevated BNP (223), and mildly elevated troponin (0.03). He tested negative for COVID-19. CXR was suggestive of bilateral infiltrates. He received Azithromycin, Ceftriaxone, and Lasix and was admitted to hospital medicine for further management.     * No surgery found *      Hospital Course:   Pt diuresing well on 40mg IV lasix BID w/ good UOP and improving SOB. LFTs also improving. BUN/Cr improving. Weaning oxygen. 5/25 2D echo w/ EF 50%, reduced RV systolic function, mild MV regurg, and pulm HTN. TSH and A1C WNL, HIV negative, B12 elevated but smear WNL. Transitioned to PO lasix on 5/27 and O2 weaned to 2L with good UOP. Levofloxacin started on 5/27 for CAP as patient became euvolemic but still requiring O2. O2 further weaned on PO lasix 40mg qd to 1L. Walk test completed. Pt discharged home to complete levaquin dose and home oxygen. F/u cardiology clinic.      Consults:     Interval History: No acute events overnight. Subjectively denies SOB today. Continued pulmonary toilet. Continues to produce good UOP.     Review of Systems   Constitutional: Negative for appetite change, chills, fever and unexpected weight change.   HENT: Negative for sore throat and trouble swallowing.    Eyes: Negative for photophobia and visual disturbance.   Respiratory: Negative for cough and shortness of breath.    Cardiovascular: Negative for chest pain, palpitations and leg swelling.   Gastrointestinal: Negative for abdominal pain, diarrhea, nausea and vomiting.   Genitourinary: Negative for difficulty urinating.   Musculoskeletal: Negative for back pain, myalgias and neck pain.   Skin: Negative for pallor and rash.   Neurological:  Negative for light-headedness, numbness and headaches.      Objective:      Vital Signs (Most Recent):  Temp: 97.4 °F (36.3 °C) (05/29/20 1503)  Pulse: 94 (05/29/20 1503)  Resp: 18 (05/29/20 1503)  BP: 108/67 (05/29/20 1503)  SpO2: (!) 94 % (05/29/20 1503) Vital Signs (24h Range):  Temp:  [96.9 °F (36.1 °C)-98.4 °F (36.9 °C)] 97.4 °F (36.3 °C)  Pulse:  [] 94  Resp:  [16-26] 18  SpO2:  [90 %-97 %] 94 %  BP: ()/(52-71) 108/67      Weight: 65.8 kg (145 lb 1 oz)  Body mass index is 20.81 kg/m².     Intake/Output Summary (Last 24 hours) at 5/29/2020 1549  Last data filed at 5/29/2020 0600      Gross per 24 hour   Intake --   Output 790 ml   Net -790 ml      Physical Exam   Constitutional: He is oriented to person, place, and time. He appears well-developed and well-nourished. No distress. Nasal cannula in place.   HENT:   Head: Normocephalic and atraumatic.   Mouth/Throat: Oropharynx is clear and moist and mucous membranes are normal.   Eyes: Pupils are equal, round, and reactive to light. Conjunctivae are normal.   Neck: No JVD present.   Cardiovascular: Normal rate, regular rhythm, normal heart sounds and normal pulses.   Pulmonary/Chest: Effort normal. No tachypnea. No respiratory distress. Rales: improved.   BL LL fine crackles   Abdominal: Soft. Normal appearance and bowel sounds are normal. He exhibits no distension. There is no tenderness.   Musculoskeletal: Normal range of motion. He exhibits no edema.   Bilateral pretibial hyperpigmentation c/w stasis dermatitis. There is no tenderness with calf palpation.    Neurological: He is alert and oriented to person, place, and time.   Skin: Skin is warm and dry. No bruising and no rash noted.         Significant Labs:   CMP:        Recent Labs   Lab 05/28/20  0402 05/29/20  0406    140   K 4.0 3.8   CL 95 96   CO2 36* 37*   GLU 82 75   BUN 23 29*   CREATININE 0.9 1.1   CALCIUM 9.0 8.9   PROT 6.0 5.7*   ALBUMIN 2.4* 2.3*   BILITOT 1.0 0.8   ALKPHOS 146*  "135   AST 81* 74*   ALT 62* 60*   ANIONGAP 8 7*   EGFRNONAA >60.0 58.8*      All pertinent labs within the past 24 hours have been reviewed.     Significant Imaging: I have reviewed all pertinent imaging results/findings within the past 24 hours.      Final Active Diagnoses:    Diagnosis Date Noted POA    PRINCIPAL PROBLEM:  Acute hypoxemic respiratory failure [J96.01] 05/25/2020 Yes    Atrial fibrillation [I48.91] 05/25/2020 Yes    Transaminitis [R74.0] 05/25/2020 Yes    Coronary artery disease involving native coronary artery of native heart without angina pectoris [I25.10] 05/25/2020 Yes    Do not resuscitate discussion [Z71.89] 05/25/2020 Not Applicable    Essential hypertension [I10] 05/25/2020 Yes    ROSA (acute kidney injury) [N17.9] 05/25/2020 Yes    Pulmonary hypertension [I27.20] 05/25/2020 Yes      Problems Resolved During this Admission:    Diagnosis Date Noted Date Resolved POA    Hyperkalemia [E87.5] 05/25/2020 05/26/2020 Yes       Discharged Condition: stable    Disposition: Home-Health Care [facility]    Follow Up:  Follow-up Information     Juan Earl MD.    Contact information:  [address]  [place] [address]  8th floor  [phone]               Patient Instructions:      OXYGEN FOR HOME USE     Order Specific Question Answer Comments   Liter Flow 1    Duration Continuous    Qualifying SpO2: 80    Testing done at: Rest    Route nasal cannula    Portable mode: pulse dose acceptable    Device home concentrator with portable unit    Length of need (in months): 99 mos    Patient condition with qualifying saturation CHF acute respiratory failure   Height: 5' 10" (1.778 m)    Weight: 65.8 kg (145 lb 1 oz)    Does patient have medical equipment at home? walker, rolling    Does patient have medical equipment at home? cane, quad    Does patient have medical equipment at home? shower chair    Alternative treatment measures have been tried or considered and deemed clinically ineffective. Yes  "   Vendor: [facility]    Expected Date of Delivery: 5/29/2020        Significant Diagnostic Studies:     Recent Results (from the past 48 hour(s))   Comprehensive Metabolic Panel (CMP)    Collection Time: 05/28/20  4:02 AM   Result Value Ref Range    Sodium 139 136 - 145 mmol/L    Potassium 4.0 3.5 - 5.1 mmol/L    Chloride 95 95 - 110 mmol/L    CO2 36 (H) 23 - 29 mmol/L    Glucose 82 70 - 110 mg/dL    BUN, Bld 23 8 - 23 mg/dL    Creatinine 0.9 0.5 - 1.4 mg/dL    Calcium 9.0 8.7 - 10.5 mg/dL    Total Protein 6.0 6.0 - 8.4 g/dL    Albumin 2.4 (L) 3.5 - 5.2 g/dL    Total Bilirubin 1.0 0.1 - 1.0 mg/dL    Alkaline Phosphatase 146 (H) 55 - 135 U/L    AST 81 (H) 10 - 40 U/L    ALT 62 (H) 10 - 44 U/L    Anion Gap 8 8 - 16 mmol/L    eGFR if African American >60.0 >60 mL/min/1.73 m^2    eGFR if non African American >60.0 >60 mL/min/1.73 m^2   Magnesium    Collection Time: 05/28/20  4:02 AM   Result Value Ref Range    Magnesium 1.9 1.6 - 2.6 mg/dL   Phosphorus    Collection Time: 05/28/20  4:02 AM   Result Value Ref Range    Phosphorus 2.8 2.7 - 4.5 mg/dL   Comprehensive Metabolic Panel (CMP)    Collection Time: 05/29/20  4:06 AM   Result Value Ref Range    Sodium 140 136 - 145 mmol/L    Potassium 3.8 3.5 - 5.1 mmol/L    Chloride 96 95 - 110 mmol/L    CO2 37 (H) 23 - 29 mmol/L    Glucose 75 70 - 110 mg/dL    BUN, Bld 29 (H) 8 - 23 mg/dL    Creatinine 1.1 0.5 - 1.4 mg/dL    Calcium 8.9 8.7 - 10.5 mg/dL    Total Protein 5.7 (L) 6.0 - 8.4 g/dL    Albumin 2.3 (L) 3.5 - 5.2 g/dL    Total Bilirubin 0.8 0.1 - 1.0 mg/dL    Alkaline Phosphatase 135 55 - 135 U/L    AST 74 (H) 10 - 40 U/L    ALT 60 (H) 10 - 44 U/L    Anion Gap 7 (L) 8 - 16 mmol/L    eGFR if African American >60.0 >60 mL/min/1.73 m^2    eGFR if non  58.8 (A) >60 mL/min/1.73 m^2   Magnesium    Collection Time: 05/29/20  4:06 AM   Result Value Ref Range    Magnesium 1.9 1.6 - 2.6 mg/dL   Phosphorus    Collection Time: 05/29/20  4:06 AM   Result Value Ref  Range    Phosphorus 2.7 2.7 - 4.5 mg/dL   CBC auto differential    Collection Time: 05/29/20  4:06 AM   Result Value Ref Range    WBC 5.73 3.90 - 12.70 K/uL    RBC 4.43 (L) 4.60 - 6.20 M/uL    Hemoglobin 14.4 14.0 - 18.0 g/dL    Hematocrit 46.6 40.0 - 54.0 %    Mean Corpuscular Volume 105 (H) 82 - 98 fL    Mean Corpuscular Hemoglobin 32.5 (H) 27.0 - 31.0 pg    Mean Corpuscular Hemoglobin Conc 30.9 (L) 32.0 - 36.0 g/dL    RDW 13.3 11.5 - 14.5 %    Platelets 274 150 - 350 K/uL    MPV 9.7 9.2 - 12.9 fL    Immature Granulocytes 0.3 0.0 - 0.5 %    Gran # (ANC) 3.4 1.8 - 7.7 K/uL    Immature Grans (Abs) 0.02 0.00 - 0.04 K/uL    Lymph # 1.0 1.0 - 4.8 K/uL    Mono # 1.1 (H) 0.3 - 1.0 K/uL    Eos # 0.3 0.0 - 0.5 K/uL    Baso # 0.03 0.00 - 0.20 K/uL    nRBC 0 0 /100 WBC    Gran% 58.6 38.0 - 73.0 %    Lymph% 16.9 (L) 18.0 - 48.0 %    Mono% 18.8 (H) 4.0 - 15.0 %    Eosinophil% 4.9 0.0 - 8.0 %    Basophil% 0.5 0.0 - 1.9 %    Differential Method Automated      Imaging Results          US Lower Extremity Veins Bilateral (Final result)  Result time 05/25/20 04:31:30    Final result by Mariano Perez MD (05/25/20 04:31:30)                 Impression:      No evidence of DVT in either lower extremity.    Electronically signed by resident: Jackson Wheat MD  Date:    05/25/2020  Time:    04:22    Electronically signed by: Mariano Perez MD  Date:    05/25/2020  Time:    04:31             Narrative:    EXAMINATION:  US LOWER EXTREMITY VEINS BILATERAL    CLINICAL HISTORY:  Acute heart and respiratory failure with bilateral lower extremity swelling; ?DVT;    TECHNIQUE:  Duplex and color flow Doppler and dynamic compression was performed of the bilateral lower extremity veins was performed.    COMPARISON:  None    FINDINGS:  Right thigh veins: The common femoral, femoral, popliteal, upper greater saphenous, and deep femoral veins are patent and free of thrombus. The veins are normally compressible and have normal phasic flow and  augmentation response.    Right calf veins: The visualized calf veins are patent.    Left thigh veins: The common femoral, femoral, popliteal, upper greater saphenous, and deep femoral veins are patent and free of thrombus. The veins are normally compressible and have normal phasic flow and augmentation response.    Left calf veins: The visualized calf veins are patent.    Miscellaneous: There is mild subcutaneous edema in the bilateral lower extremities.                               X-Ray Chest AP Portable (Final result)  Result time 05/24/20 22:59:21    Final result by Mariano Perez MD (05/24/20 22:59:21)                 Impression:      Bilateral patchy airspace infiltrates in the upper lobes and left lower lung field which could represent pneumonia or aspiration.    Cardiomegaly.      Electronically signed by: Mariano Perez MD  Date:    05/24/2020  Time:    22:59             Narrative:    EXAMINATION:  XR CHEST AP PORTABLE    CLINICAL HISTORY:  Sepsis;    TECHNIQUE:  Single frontal view of the chest was performed.    COMPARISON:  None    FINDINGS:  Bilateral patchy airspace infiltrates in the upper lobes and at the left lower lung field.    No consolidation or pleural effusion.    Cardiomediastinal silhouette is enlarged.                                  Pending Diagnostic Studies:     None         Medications:  Reconciled Home Medications:      Medication List      START taking these medications    furosemide 40 MG tablet  Commonly known as:  LASIX  Take 1 tablet (40 mg total) by mouth once daily.  Start taking on:  May 30, 2020     levoFLOXacin 750 MG tablet  Commonly known as:  LEVAQUIN  Take 1 tablet (750 mg total) by mouth every other day. for 2 days  Start taking on:  May 31, 2020        CONTINUE taking these medications    calcium citrate 200 mg (950 mg) tablet  Commonly known as:  CALCITRATE  Take 1 tablet by mouth once daily.     ciclopirox 0.77 % Crea  Commonly known as:  LOPROX  Apply twice a  day to fungus of back.     simvastatin 20 MG tablet  Commonly known as:  ZOCOR  Take 40 mg by mouth every evening.     XARELTO 15 mg Tab  Generic drug:  rivaroxaban  Take 15 mg by mouth daily with dinner or evening meal.        STOP taking these medications    ciprofloxacin HCl 250 MG tablet  Commonly known as:  CIPRO     lisinopriL 10 MG tablet            Indwelling Lines/Drains at time of discharge:   Lines/Drains/Airways     None                 Time spent on the discharge of patient: 40 minutes  Patient was seen and examined on the date of discharge and determined to be suitable for discharge.         Vijaya Alex MD  Department of Hospital Medicine  [facility]

## 2020-05-29 NOTE — NURSING
1550 VS. /58, HR 85. MAP 75, 02 94%, T 97.7 Nurse reviewed discharge orders with patient face to face as well as with daughter via telephone.  Daughter (Eliana) is awaiting a call from patients primary care team regarding the cardiologist consultation or any other instructions.Nurse spoke to daughter after father was safely in the care to ask if she had any further questions regarding father at home care, she understood the AVS as well and how to use the oxygen tank.  Patient gave a return demonstration on un turning on 02 and applying the nc to himself, he appear to understand how to use it.  Nurse removed a iv access from LFA, catheter was intact, no bleeding after a minute or so of pressure, patient denied any pain or discomfortable.

## 2020-05-29 NOTE — PT/OT/SLP PROGRESS
"Physical Therapy Treatment    Patient Name:  Boris Garcia   MRN:  [id number]    Recommendations:     Discharge Recommendations:  home health PT   Discharge Equipment Recommendations: none   Barriers to discharge: None    Assessment:     Boris Garcia is a 89 y.o. male admitted with a medical diagnosis of Acute hypoxemic respiratory failure.  He presents with the following impairments/functional limitations:  weakness, gait instability, impaired cardiopulmonary response to activity, impaired endurance, impaired balance, decreased lower extremity function, decreased safety awareness, impaired self care skills, impaired functional mobilty. Pt tolerated session well on this date focusing on gait training. Pt found resting on 1L O2 w/sats at 96-99%. Following removal of O2 and 1min of seated rest SPo2 decreased to 92% and following gait training 20ft without O2 SPo2 decreased to 80%. 1L O2 re-applied and following 1min of seated rest SPo2 returned to 96%. Pt would benefit from continued skilled acute PT 4x/wk to improve functional mobility.  Recommending pt receive PT services in [facility] setting following discharge from hospital once medically cleared.     Rehab Prognosis: Good; patient would benefit from acute skilled PT services to address these deficits and reach maximum level of function.    Recent Surgery: * No surgery found *      Plan:     During this hospitalization, patient to be seen 4 x/week to address the identified rehab impairments via gait training, therapeutic activities, therapeutic exercises, neuromuscular re-education and progress toward the following goals:    · Plan of Care Expires:  06/25/20    Subjective     Chief Complaint: none noted   Patient/Family Comments/goals: "is there a problem? I feel fine, I'm not SOB" -following gait training without O2  Pain/Comfort:  · Pain Rating 1: 0/10      Objective:     Communicated with NSG prior to session.  Patient found up in chair with oxygen upon PT entry to " room.     General Precautions: Standard, fall   Orthopedic Precautions:N/A   Braces: N/A     Functional Mobility:  · Transfers:     · Sit to Stand:  supervision with rolling walker  · Gait: 20ft SBA w/RW (see assessment for detailed O2 measurements)  · Balance: SPV      AM-PAC 6 CLICK MOBILITY  Turning over in bed (including adjusting bedclothes, sheets and blankets)?: 3  Sitting down on and standing up from a chair with arms (e.g., wheelchair, bedside commode, etc.): 3  Moving from lying on back to sitting on the side of the bed?: 3  Moving to and from a bed to a chair (including a wheelchair)?: 3  Need to walk in hospital room?: 3       Therapeutic Activities and Exercises:   - Sit-to-Stand x3 trials SPV w/RW  - Pt educated on:   -PT roles, expectations, and POC    -Safety with mobility   -Benefits of OOB activities to increase strength and functional mobility    -Performing ther ex for increasing LE ROM and strength   -Discharge recommendations     Patient left up in chair with call button in reach..    GOALS:   Multidisciplinary Problems     Physical Therapy Goals        Problem: Physical Therapy Goal    Goal Priority Disciplines Outcome Goal Variances Interventions   Physical Therapy Goal     PT, PT/OT Ongoing, Progressing     Description:  Goals to be met by: 2020    Patient will increase functional independence with mobility by performin. Supine to sit with Modified [place]  2. Sit to supine with Modified [place]  3. Sit to stand transfer with Modified [place]  4. Gait  x 50 feet with Modified [place] using LRAD  5. Lower extremity exercise program x15 reps, with independence                     Time Tracking:     PT Received On: 20  PT Start Time: 841     PT Stop Time: 908  PT Total Time (min): 27 min     Billable Minutes: Gait Training 27    Treatment Type: Treatment  PT/PTA: PT           Chavez Jones, PT  2020

## 2020-05-29 NOTE — PROGRESS NOTES
Pharmacist Renal Dose Adjustment Note    Boris Garcia is a 89 y.o. male being treated with the medication levofloxacin     Patient Data:    Vital Signs (Most Recent):  Temp: 96.9 °F (36.1 °C) (05/29/20 1101)  Pulse: 88 (05/29/20 1101)  Resp: 18 (05/29/20 1101)  BP: (!) 92/53 (05/29/20 1101)  SpO2: (!) 93 % (05/29/20 1101)   Vital Signs (72h Range):  Temp:  [96.4 °F (35.8 °C)-98.8 °F (37.1 °C)]   Pulse:  []   Resp:  [16-26]   BP: ()/(52-73)   SpO2:  [90 %-97 %]      Recent Labs   Lab 05/27/20  0335 05/28/20  0402 05/29/20  0406   CREATININE 0.8 0.9 1.1     Serum creatinine: 1.1 mg/dL 05/29/20 0406  Estimated creatinine clearance: 41.5 mL/min    Medication:levofloxacin 750 mg Q 24 hr will be changed to medication: levofloxacin 750 mg Q 48 hr   Abx day 3/5 today     Pharmacist's Name: April Gamez  Pharmacist's Extension: [id number]

## 2020-05-29 NOTE — PLAN OF CARE
Pt d/c to home with home O2 and [facility]. Home O2 ordered primary nurse made aware not to d.c home without O2. Primary nurse instructed to call daughter Eliana with d/c instructions.     05/29/20 1547   Final Note   Assessment Type Final Discharge Note   Anticipated Discharge Disposition Home-Health   Hospital Follow Up  Appt(s) scheduled? Yes   Post-Acute Status   Post-Acute Authorization Placement;Home Health   Post-Acute Placement Status Set-up Complete   Home Health Status Set-up Complete   Pippa Orozco, MSN  Case Management  Ext [phone]

## 2020-05-29 NOTE — PLAN OF CARE
Home oxygen eval complete, awaiting order from MD for home O2 order will cont to follow.  Pippa Orozco, MSN  Case Management  Ext [phone]

## 2020-05-29 NOTE — PLAN OF CARE
CM spoke with daughter this am, she would like [facility]  will place referral. Awaiting home O2 eval from floor for DME Will cont to follow     05/29/20 5598   Post-Acute Status   Post-Acute Authorization Placement   Post-Acute Placement Status Referrals Sent   Pippa Orozco, MSN  Case Management  Ext [phone]

## 2020-05-29 NOTE — NURSING
Home Oxygen Evaluation    Date Performed: 5/29/2020    1) Patient's Home O2 Sat on room air, while at rest:  80%      If O2 sats on room air at rest are 88% or below, patient qualifies. No additional testing needed. Document N/A in steps 2 and 3. If 89% or above, complete steps 2.      2) Patient's O2 Sat on room air while exercising:      If O2 sats on room air while exercising remain 89% or above patient does not qualify, no further testing needed Document N/A in step 3. If O2 sats on room air while exercising are 88% or below, continue to step 3.      3) Patient's O2 Sat while exercising on    (Must show improvement from #2 for patients to qualify)    If O2 sats improve on oxygen, patient qualifies for portable oxygen. If not, the patient does not qualify.    6 minute test done this morning, patient 02 status is 80% while on room air, requires 02 for home.  Patient required 1l/m to regain a saturation of 96%.

## 2020-05-29 NOTE — PLAN OF CARE
Pt would benefit from continued skilled acute PT services to improve functional mobility. POC is appropriate and pt should continue towards current goals set to progress to PLOF.       Problem: Physical Therapy Goal  Goal: Physical Therapy Goal  Description  Goals to be met by: 2020    Patient will increase functional independence with mobility by performin. Supine to sit with Modified [place]  2. Sit to supine with Modified [place]  3. Sit to stand transfer with Modified [place]  4. Gait  x 50 feet with Modified [place] using LRAD  5. Lower extremity exercise program x15 reps, with independence    Outcome: Ongoing, Progressing

## 2020-05-29 NOTE — NURSING
Pt refused 4am weight. Pt was already sitting in recliner this morning. He explained after lab came and keny his labs he couldn't go back to sleep an decided to sit in recliner. I will pass on to daytime shift to obtain weight while in bed .

## 2020-05-29 NOTE — PLAN OF CARE
[facility]    HOME HEALTH ORDERS  FACE TO FACE ENCOUNTER    Patient Name: Boris Garcia  YOB: 1929    PCP: To Obtain Unable   PCP Address: None  PCP Phone Number: None  PCP Fax: None    Encounter Date: 05/29/2020    Admit to Home Health    Diagnoses:  Active Hospital Problems    Diagnosis  POA    *Acute hypoxemic respiratory failure [J96.01]  Yes    Atrial fibrillation [I48.91]  Yes    Transaminitis [R74.0]  Yes    Coronary artery disease involving native coronary artery of native heart without angina pectoris [I25.10]  Yes    Do not resuscitate discussion [Z71.89]  Not Applicable    Essential hypertension [I10]  Yes    ROSA (acute kidney injury) [N17.9]  Yes    Pulmonary hypertension [I27.20]  Yes      Resolved Hospital Problems    Diagnosis Date Resolved POA    Hyperkalemia [E87.5] 05/26/2020 Yes       Future Appointments   Date Time Provider Department Center   6/17/2020 10:00 AM Juan Earl III, MD [facility] CARDIO [place]     Follow-up Information     Juan Earl MD.    Contact information:  [address] [facility] [place] [address]  8th floor  [phone]                   I have seen and examined this patient face to face today. My clinical findings that support the need for the home health skilled services and home bound status are the following:  Weakness/numbness causing balance and gait disturbance due to Heart Failure and Weakness/Debility making it taxing to leave home.    Allergies:  Review of patient's allergies indicates:   Allergen Reactions    Penicillins     Sulfa (sulfonamide antibiotics) Hives       Diet: cardiac diet    Activities: activity as tolerated    Nursing:   SN to complete comprehensive assessment including routine vital signs. Instruct on disease process and s/s of complications to report to MD. Review/verify medication list sent home with the patient at time of discharge  and instruct patient/caregiver as needed. Frequency may be  adjusted depending on start of care date.    Notify MD if SBP > 160 or < 90; DBP > 90 or < 50; HR > 120 or < 50; Temp > 101;      CONSULTS:    Physical Therapy to evaluate and treat. Evaluate for home safety and equipment needs; Establish/upgrade home exercise program. Perform / instruct on therapeutic exercises, gait training, transfer training, and Range of Motion.  Occupational Therapy to evaluate and treat. Evaluate home environment for safety and equipment needs. Perform/Instruct on transfers, ADL training, ROM, and therapeutic exercises.    MISCELLANEOUS CARE:  Home Oxygen:  Oxygen at 2 L/min nasal canula to be used:  Continuously.    WOUND CARE ORDERS  n/a      Medications: Review discharge medications with patient and family and provide education.      Current Discharge Medication List      START taking these medications    Details   furosemide (LASIX) 40 MG tablet Take 1 tablet (40 mg total) by mouth once daily.  Qty: 30 tablet, Refills: 3      levoFLOXacin (LEVAQUIN) 750 MG tablet Take 1 tablet (750 mg total) by mouth once daily. for 2 days  Qty: 2 tablet, Refills: 0         CONTINUE these medications which have NOT CHANGED    Details   calcium citrate (CALCITRATE) 200 mg (950 mg) tablet Take 1 tablet by mouth once daily.      rivaroxaban (XARELTO) 15 mg Tab Take 15 mg by mouth daily with dinner or evening meal.      simvastatin (ZOCOR) 20 MG tablet Take 40 mg by mouth every evening.      ciclopirox (LOPROX) 0.77 % Crea Apply twice a day to fungus of back.  Qty: 90 g, Refills: 2         STOP taking these medications       ciprofloxacin HCl (CIPRO) 250 MG tablet Comments:   Reason for Stopping:         lisinopriL 10 MG tablet Comments:   Reason for Stopping:               I certify that this patient is confined to his home and needs physical therapy and occupational therapy.

## 2020-05-29 NOTE — PLAN OF CARE
CM spoke with primary nurse regarding home O2 eval, Test is complete primary will document results shortly. Will order O2 at that time. Will cont to follow.  Pippa Orozco, MSN  Case Management  Ext [phone]

## 2020-05-29 NOTE — PT/OT/SLP PROGRESS
Occupational Therapy   Treatment    Name: Boris Garcia  MRN: [id number]  Admitting Diagnosis:  Acute hypoxemic respiratory failure       Recommendations:     Discharge Recommendations: home health OT  Discharge Equipment Recommendations:  none  Barriers to discharge:  None    Assessment:     Boris Garcia is a 89 y.o. male with a medical diagnosis of Acute hypoxemic respiratory failure. Performance deficits affecting function are weakness, impaired endurance, impaired self care skills, gait instability, impaired functional mobilty, impaired balance, decreased lower extremity function, impaired cardiopulmonary response to activity. Patient would benefit from continued skilled acute OT 3x/wk to improve functional mobility, increase independence with ADLs, and address established goals. Recommending HHOT once medically appropriate for discharge to increase maximal independence, reduce burden of care, and ensure safety.     Rehab Prognosis:  Good; patient would benefit from acute skilled OT services to address these deficits and reach maximum level of function.       Plan:     Patient to be seen 3 x/week to address the above listed problems via self-care/home management, therapeutic activities, therapeutic exercises  · Plan of Care Expires: 06/26/20  · Plan of Care Reviewed with: patient    Subjective     Pain/Comfort:  · Pain Rating 1: 0/10  · Pain Rating Post-Intervention 1: 0/10    Objective:     Communicated with: MERCEDES prior to session.  Patient found up in chair with   upon OT entry to room.    General Precautions: Standard, fall   Orthopedic Precautions:N/A   Braces:  N/A    Occupational Performance:     Functional Mobility/Transfers:  · Patient completed Sit <> Stand Transfer with supervision  with  rolling walker   · Patient completed Toilet Transfer bedside chair <>toilet with functional ambulation technique with supervision with  rolling walker    Activities of Daily Living:  · Feeding:  independence     · Grooming: supervision standing at sink to wash/dry hands  · Lower Body Dressing: setup for doffing and donning socks only        AMPA 6 Click ADL: 19    Treatment & Education:  Role of OT and POC  Safety  ADL retraining  Functional mobility training  Importance and benefit of therapy participation    Patient left up in chair with call button in reach and all needs met. Education:      GOALS:   Multidisciplinary Problems     Occupational Therapy Goals        Problem: Occupational Therapy Goal    Goal Priority Disciplines Outcome Interventions   Occupational Therapy Goal     OT, PT/OT Ongoing, Progressing    Description:  Goals to be met by: 6/9/2020     Patient will increase functional independence with ADLs by performing:    UE Dressing with Set-up Assistance.  Grooming while standing at sink with Modified [place].  Stand pivot transfers with Modified [place].  Toilet transfer to toilet with Modified [place].                      Time Tracking:     OT Date of Treatment: 05/29/20  OT Start Time: 1145  OT Stop Time: 1200  OT Total Time (min): 15 min    Billable Minutes:Self Care/Home Management 15    FRITZ Acosta  5/29/2020

## 2020-05-29 NOTE — PLAN OF CARE
05/29/20 0844   Post-Acute Status   Post-Acute Authorization Home Health;HME   Home Health Status Referrals Sent

## 2020-05-30 LAB
BACTERIA BLD CULT: NORMAL
BACTERIA BLD CULT: NORMAL

## 2020-05-30 PROCEDURE — G0180 PR HOME HEALTH MD CERTIFICATION: ICD-10-PCS | Mod: ,,, | Performed by: HOSPITALIST

## 2020-05-30 PROCEDURE — G0180 MD CERTIFICATION HHA PATIENT: HCPCS | Mod: ,,, | Performed by: HOSPITALIST

## 2020-06-01 ENCOUNTER — PATIENT OUTREACH (OUTPATIENT)
Dept: ADMINISTRATIVE | Facility: CLINIC | Age: 85
End: 2020-06-01

## 2020-06-01 NOTE — PATIENT INSTRUCTIONS

## 2020-06-05 ENCOUNTER — EXTERNAL HOME HEALTH (OUTPATIENT)
Dept: HOME HEALTH SERVICES | Facility: HOSPITAL | Age: 85
End: 2020-06-05
Payer: COMMERCIAL

## 2020-06-30 RX ORDER — FUROSEMIDE 40 MG/1
40 TABLET ORAL DAILY
Qty: 30 TABLET | Refills: 3 | Status: SHIPPED | OUTPATIENT
Start: 2020-06-30 | End: 2021-06-30

## 2020-06-30 NOTE — TELEPHONE ENCOUNTER
----- Message from Litzy Jaquez sent at 6/30/2020  4:47 PM CDT -----  Contact: [facility] [phone] ref#[id number]  Vijaya Alex MD    Requesting an RX refill or new RX.  Is this a refill or new RX:    RX name and strength: furosemide (LASIX) 40 MG tablet  Directions (copy/paste from chart):    Is this a 30 day or 90 day RX:    Local pharmacy or mail order pharmacy:  mail  Pharmacy name and phone # (copy/paste from chart):   [facility] - [place], [place] - [address] AT Portal to Registered [facility] Sites [phone] (Phone)  [phone] (Fax)      Comments:  States that he could be allergic to medication, but patient hasn't had any problems with it. Requesting a call to get it cleared for him to continue to take it.

## 2020-06-30 NOTE — TELEPHONE ENCOUNTER
----- Message from Jael Tejadagabe sent at 6/30/2020  3:36 PM CDT -----  Regarding: Refill  Contact: Patient @ [phone]  Requesting an RX refill or new RX.  Is this a refill or new RX:  Refill  RX name and strength: furosemide (LASIX) 40 MG tablet  Directions (copy/paste from chart):    Is this a 30 day or 90 day RX:    Local pharmacy or mail order pharmacy:  Local  Pharmacy [facility] and phone # ([facility] [place] [place] - [address] AT Portal to Registered [facility] Sites  Comments:  Pharmacy asked for a call to ok RX. Please call [phone]

## 2020-06-30 NOTE — TELEPHONE ENCOUNTER
Please see the attached refill request.  Pt also requires follow-up for recent CHF exacerbation admission. He cancelled his f/u appt it seems.

## 2020-06-30 NOTE — TELEPHONE ENCOUNTER
----- Message from Jael Tejadagabe sent at 6/30/2020  3:36 PM CDT -----  Regarding: Refill  Contact: Patient @ [phone]  Requesting an RX refill or new RX.  Is this a refill or new RX:  Refill  RX name and strength: furosemide (LASIX) 40 MG tablet  Directions (copy/paste from chart):    Is this a 30 day or 90 day RX:    Local pharmacy or mail order pharmacy:  Local  Pharmacy [facility] and phone # ([facility] [place] [place] - [address] AT Portal to Registered [facility] Sites  Comments:  Pharmacy asked for a call to ok RX. Please call [phone]

## 2020-07-09 ENCOUNTER — TELEPHONE (OUTPATIENT)
Dept: CARDIOLOGY | Facility: CLINIC | Age: 85
End: 2020-07-09

## 2020-07-09 NOTE — TELEPHONE ENCOUNTER
Spoke with patient regarding request for Lasix to be sent to [facility]. Advised patient needs post visit hospital follow up in cardiology. Patient states has another cardiologist he sees and will request refill from them.